# Patient Record
Sex: FEMALE | Race: WHITE | NOT HISPANIC OR LATINO
[De-identification: names, ages, dates, MRNs, and addresses within clinical notes are randomized per-mention and may not be internally consistent; named-entity substitution may affect disease eponyms.]

---

## 2017-06-12 ENCOUNTER — APPOINTMENT (OUTPATIENT)
Dept: CARDIOLOGY | Facility: CLINIC | Age: 77
End: 2017-06-12

## 2017-07-18 ENCOUNTER — TRANSCRIPTION ENCOUNTER (OUTPATIENT)
Age: 77
End: 2017-07-18

## 2017-09-29 ENCOUNTER — RECORD ABSTRACTING (OUTPATIENT)
Age: 77
End: 2017-09-29

## 2017-09-29 DIAGNOSIS — K21.9 GASTRO-ESOPHAGEAL REFLUX DISEASE W/OUT ESOPHAGITIS: ICD-10-CM

## 2017-09-29 DIAGNOSIS — Z86.39 PERSONAL HISTORY OF OTHER ENDOCRINE, NUTRITIONAL AND METABOLIC DISEASE: ICD-10-CM

## 2017-09-29 DIAGNOSIS — E03.9 HYPOTHYROIDISM, UNSPECIFIED: ICD-10-CM

## 2017-09-29 DIAGNOSIS — Z72.0 TOBACCO USE: ICD-10-CM

## 2017-09-29 DIAGNOSIS — G40.909 EPILEPSY, UNSPECIFIED, NOT INTRACTABLE, W/OUT STATUS EPILEPTICUS: ICD-10-CM

## 2017-09-29 DIAGNOSIS — G62.9 POLYNEUROPATHY, UNSPECIFIED: ICD-10-CM

## 2017-09-29 DIAGNOSIS — Z98.890 OTHER SPECIFIED POSTPROCEDURAL STATES: ICD-10-CM

## 2017-09-29 RX ORDER — LEVOTHYROXINE SODIUM 75 UG/1
75 TABLET ORAL DAILY
Refills: 0 | Status: ACTIVE | COMMUNITY

## 2018-01-29 ENCOUNTER — NON-APPOINTMENT (OUTPATIENT)
Age: 78
End: 2018-01-29

## 2018-01-29 ENCOUNTER — APPOINTMENT (OUTPATIENT)
Dept: CARDIOLOGY | Facility: CLINIC | Age: 78
End: 2018-01-29
Payer: MEDICARE

## 2018-01-29 VITALS
BODY MASS INDEX: 21.21 KG/M2 | HEIGHT: 66 IN | WEIGHT: 132 LBS | DIASTOLIC BLOOD PRESSURE: 66 MMHG | SYSTOLIC BLOOD PRESSURE: 118 MMHG | HEART RATE: 82 BPM

## 2018-01-29 PROCEDURE — 99214 OFFICE O/P EST MOD 30 MIN: CPT

## 2018-01-29 PROCEDURE — 93000 ELECTROCARDIOGRAM COMPLETE: CPT

## 2018-01-29 RX ORDER — PRIMIDONE 50 MG/1
50 TABLET ORAL TWICE DAILY
Refills: 0 | Status: DISCONTINUED | COMMUNITY
End: 2018-01-29

## 2018-01-29 RX ORDER — LEVETIRACETAM 500 MG/1
500 TABLET, FILM COATED ORAL TWICE DAILY
Refills: 0 | Status: DISCONTINUED | COMMUNITY
End: 2018-01-29

## 2018-01-29 RX ORDER — OXYBUTYNIN CHLORIDE 5 MG/1
5 TABLET ORAL
Refills: 0 | Status: DISCONTINUED | COMMUNITY
End: 2018-01-29

## 2018-01-29 RX ORDER — OMEPRAZOLE 40 MG/1
40 CAPSULE, DELAYED RELEASE ORAL
Qty: 30 | Refills: 4 | Status: DISCONTINUED | COMMUNITY
End: 2018-01-29

## 2018-08-01 ENCOUNTER — APPOINTMENT (OUTPATIENT)
Dept: CARDIOLOGY | Facility: CLINIC | Age: 78
End: 2018-08-01
Payer: MEDICARE

## 2018-08-01 PROCEDURE — 93306 TTE W/DOPPLER COMPLETE: CPT

## 2018-08-17 ENCOUNTER — RECORD ABSTRACTING (OUTPATIENT)
Age: 78
End: 2018-08-17

## 2018-08-20 ENCOUNTER — APPOINTMENT (OUTPATIENT)
Dept: CARDIOLOGY | Facility: CLINIC | Age: 78
End: 2018-08-20
Payer: MEDICARE

## 2018-08-20 VITALS
HEART RATE: 76 BPM | BODY MASS INDEX: 21.86 KG/M2 | OXYGEN SATURATION: 95 % | WEIGHT: 136 LBS | DIASTOLIC BLOOD PRESSURE: 60 MMHG | SYSTOLIC BLOOD PRESSURE: 120 MMHG | HEIGHT: 66 IN

## 2018-08-20 PROCEDURE — 99214 OFFICE O/P EST MOD 30 MIN: CPT

## 2018-08-20 RX ORDER — ASPIRIN ENTERIC COATED TABLETS 81 MG 81 MG/1
81 TABLET, DELAYED RELEASE ORAL
Refills: 0 | Status: DISCONTINUED | COMMUNITY
End: 2018-08-20

## 2019-02-08 ENCOUNTER — RECORD ABSTRACTING (OUTPATIENT)
Age: 79
End: 2019-02-08

## 2019-02-27 ENCOUNTER — RECORD ABSTRACTING (OUTPATIENT)
Age: 79
End: 2019-02-27

## 2019-03-04 ENCOUNTER — APPOINTMENT (OUTPATIENT)
Dept: CARDIOLOGY | Facility: CLINIC | Age: 79
End: 2019-03-04
Payer: MEDICARE

## 2019-03-04 ENCOUNTER — RECORD ABSTRACTING (OUTPATIENT)
Age: 79
End: 2019-03-04

## 2019-03-04 ENCOUNTER — NON-APPOINTMENT (OUTPATIENT)
Age: 79
End: 2019-03-04

## 2019-03-04 VITALS
OXYGEN SATURATION: 96 % | BODY MASS INDEX: 22.02 KG/M2 | HEIGHT: 66 IN | HEART RATE: 76 BPM | DIASTOLIC BLOOD PRESSURE: 64 MMHG | WEIGHT: 137 LBS | SYSTOLIC BLOOD PRESSURE: 118 MMHG

## 2019-03-04 PROCEDURE — 99214 OFFICE O/P EST MOD 30 MIN: CPT

## 2019-03-04 PROCEDURE — 93000 ELECTROCARDIOGRAM COMPLETE: CPT

## 2019-03-04 RX ORDER — CONJUGATED ESTROGENS 0.62 MG/G
0.62 CREAM VAGINAL DAILY
Refills: 0 | Status: DISCONTINUED | COMMUNITY
End: 2019-03-04

## 2019-03-04 NOTE — HISTORY OF PRESENT ILLNESS
[FreeTextEntry1] : Active medical problems as noted below.\par \par •Hypothyroidism/ thyroid nodule on Synthroid replacement.\par \par •Dyslipidemia, on pravastatin, tolerating it well.\par \par •Mild carotid atherosclerotic vascular disease. Asymptomatic.\par \par •Mild mitral and aortic insufficiency and tricuspid regurgitation. No signs of CHF.\par \par •She has also been diagnosed to have possible peripheral neuropathy. \par \par PAD mild, no claudication\par

## 2019-03-04 NOTE — ASSESSMENT
[FreeTextEntry1] : past tests for reference:\par \par GURU and PVR study which was done on May 7, 2014 showed mild atherosclerosis, nonobstructive, overall improvement in GURU i\par Carotid Doppler study on January 16, 2015 showed mild nonobstructive disease bilaterally. The thyroid nodule on the left side was noted again.\par Echocardiogram which was done on January 16, 2015 showed EF around 60%, normal chamber sizes. There was trace-to-mild mitral regurgitation, mild-to-moderate tricuspid regurgitation. Pulmonary artery systolic pressure was stable.\par Echocardiogram 4/28/2016 EF 60%.  Mild mitral regurgitation mild tricuspid regurgitation.  The ASP 33 mmHg\par Carotid Doppler study.  Mild atherosclerosisHolter monitor 3/14/2016.  Normal sinus rhythm.  Average heart rate 71.  Maximum heart rate 138.  Minimum heart rate 47.  Premature ventricular beats noted.  3 beats NSVT versus aberrancy noted\par nuclear perfusion: 6/20/16\par 4 min 7 METs. non ischemic symptoms, VPC's non ischemic ekg's, non ischemic perfusion scan\par cardiac cath 8/12: normal coronaries\par Labs from May 17, 2017 reviewed. TSH 2.4, atypia, . HDL 68. Triglycerides 76. CMP is stable with creatinine 0.55\par \par Reviewed on January 29, 2018.\par Labs from January 26, 2018. CBC CMP was reported normal. LDL 83, triglycerides 84, HDL 57, total cholesterol 157. TSH 1.4.\par \par EKG ordered and interpreted by me on January 29 of 18. Indication history of cardiac arrhythmias. Dizziness. Interpretation. Normal sinus rhythm. Nonspecific ST-T changes.\par \par Reviewed on January 29, 2018.\par Labs from January 26, 2018. CBC CMP was reported normal. LDL 83, triglycerides 84, HDL 57, total cholesterol 157. TSH 1.4.\par \par EKG ordered and interpreted by me on January 29 of 18. Indication history of cardiac arrhythmias. Dizziness. Interpretation. Normal sinus rhythm. Nonspecific ST-T changes.\par \par Reviewed on August 20, 2018.\par Labs from June 27, 2018. Normal CBC.\par Other labs, which were done with primary care physician. Are not available for me to review requested\par Echocardiogram August 1, 2018, ejection fraction 60% normal chamber dimensions, mild mitral and aortic insufficiency. Normal pulmonary pressures.\par \par Reviewed on March 4, 2019\par EKG. March 4, 2019. Indication be a cardiac arrhythmia. Dizziness, but interpretation. Normal sinus rhythm. Nonspecific ST-T changes. [place]

## 2019-03-04 NOTE — REASON FOR VISIT
[Follow-Up - Clinic] : a clinic follow-up of [Hyperlipidemia] : hyperlipidemia [Peripheral Vascular Disease] : peripheral vascular disease [FreeTextEntry1] : 79 years old comes in for followup consultation. She did not get her labs done.\par She has muscle ache, involving the upper extremities. Mild severity. No focal weakness. No aggravating or relieving factors. Continue tremors.\par Continued use of walker.\par Continued intermittent dizziness. Non-postural. Evaluation has been negative according to her. No syncopal episode. No association with palpitations. No headache or focal weakness. Unchanged from before.\par She has no chest pain. No PND, orthopnea.\par She has no pedal edema. No palpitation,  or syncopal episodes.\par No recent hospitalization.

## 2019-03-04 NOTE — PHYSICAL EXAM
[General Appearance - Well Developed] : well developed [Normal Appearance] : normal appearance [Well Groomed] : well groomed [General Appearance - Well Nourished] : well nourished [No Deformities] : no deformities [General Appearance - In No Acute Distress] : no acute distress [Normal Conjunctiva] : the conjunctiva exhibited no abnormalities [Eyelids - No Xanthelasma] : the eyelids demonstrated no xanthelasmas [Normal Jugular Venous A Waves Present] : normal jugular venous A waves present [Normal Jugular Venous V Waves Present] : normal jugular venous V waves present [No Jugular Venous Barnard A Waves] : no jugular venous barnard A waves [Respiration, Rhythm And Depth] : normal respiratory rhythm and effort [Exaggerated Use Of Accessory Muscles For Inspiration] : no accessory muscle use [Auscultation Breath Sounds / Voice Sounds] : lungs were clear to auscultation bilaterally [Heart Rate And Rhythm] : heart rate and rhythm were normal [Heart Sounds] : normal S1 and S2 [Arterial Pulses Normal] : the arterial pulses were normal [Edema] : no peripheral edema present [Veins - Varicosity Changes] : no varicosital changes were noted in the lower extremities [FreeTextEntry1] : walks with a walker [Nail Clubbing] : no clubbing of the fingernails [Cyanosis, Localized] : no localized cyanosis [Skin Color & Pigmentation] : normal skin color and pigmentation [] : no rash [Oriented To Time, Place, And Person] : oriented to person, place, and time [Affect] : the affect was normal [Mood] : the mood was normal [No Anxiety] : not feeling anxious

## 2019-03-04 NOTE — DISCUSSION/SUMMARY
[FreeTextEntry1] : 79-year-old , overall stable from a cardiovascular point of view, dove with limited functional status. Clinically, no signs of unstable, CAD, CHF.\par Medical problems includes\par #1 hyperlipidemia  on statin. Tolerating it well. In presence of myalgia. I have recommended her to have a fasting lipid panel, total CPK. Along with CMP. And TSH.\par #2 peripheral arterial disease with borderline GURU/PVR study and carotid atherosclerosis. \par Continue lifestyle and risk factor modifications. Continue statin therapy. No neurological event. No significant signs of ischemia on lower extremity examination  or significant claudication pain. Continue activity in the form of walking\par Aspirin to be restarted when safe from a gastroenterology point of view.\par She could not get ABIs/. PVR study done. She will reschedule.\par #3 history of nonsustained ventricular tachycardia. No correlation with her dizziness. Preserved LV systolic function. Normal coronary arteries. A stable blood pressure, heart rate. Not on beta blockers. No history of syncopal episode. Continue present medications at present. \par #4 mitral insufficiency. aortic insufficiency. non rheuatic. Stable on clinical examination,  Continue to follow.\par \par Counseling regarding low saturated fat, salt and carbohydrate intake was reviewed. Active lifestyle and regular. Exercise along with weight management is advised.\par \par All the above were at length reviewed. Answered all the questions. Thank you very much for this kind referral. Please do not hesitate to give me a call for any question.\par Part of this transcription was done with voice recognition software and phonetically similar errors are common. I apologize for that. Please donot hesitate to call for any questions due to above.\par \par f/u  6 months

## 2019-03-04 NOTE — REVIEW OF SYSTEMS
[Shortness Of Breath] : no shortness of breath [Dyspnea on exertion] : not dyspnea during exertion [Chest  Pressure] : no chest pressure [Chest Pain] : chest pain [Lower Ext Edema] : no extremity edema [Leg Claudication] : no intermittent leg claudication [Palpitations] : no palpitations [Abdominal Pain] : no abdominal pain [Heartburn] : heartburn [Joint Pain] : joint pain [Joint Stiffness] : joint stiffness [Dizziness] : dizziness [Tremor] : a tremor was seen [Numbness (Hypesthesia)] : no numbness [Convulsions] : no convulsions [Tingling (Paresthesia)] : no tingling [Negative] : Heme/Lymph

## 2019-06-02 ENCOUNTER — TRANSCRIPTION ENCOUNTER (OUTPATIENT)
Age: 79
End: 2019-06-02

## 2019-10-31 ENCOUNTER — APPOINTMENT (OUTPATIENT)
Dept: CARDIOLOGY | Facility: CLINIC | Age: 79
End: 2019-10-31

## 2019-11-02 ENCOUNTER — TRANSCRIPTION ENCOUNTER (OUTPATIENT)
Age: 79
End: 2019-11-02

## 2019-11-11 ENCOUNTER — APPOINTMENT (OUTPATIENT)
Dept: CARDIOLOGY | Facility: CLINIC | Age: 79
End: 2019-11-11
Payer: MEDICARE

## 2019-11-11 ENCOUNTER — NON-APPOINTMENT (OUTPATIENT)
Age: 79
End: 2019-11-11

## 2019-11-11 VITALS
SYSTOLIC BLOOD PRESSURE: 118 MMHG | HEART RATE: 84 BPM | BODY MASS INDEX: 21.69 KG/M2 | WEIGHT: 135 LBS | DIASTOLIC BLOOD PRESSURE: 64 MMHG | OXYGEN SATURATION: 96 % | HEIGHT: 66 IN

## 2019-11-11 PROCEDURE — 99214 OFFICE O/P EST MOD 30 MIN: CPT

## 2019-11-11 PROCEDURE — 93000 ELECTROCARDIOGRAM COMPLETE: CPT

## 2019-11-11 RX ORDER — OXYBUTYNIN CHLORIDE 5 MG/1
5 TABLET, EXTENDED RELEASE ORAL TWICE DAILY
Refills: 0 | Status: ACTIVE | COMMUNITY

## 2019-11-11 RX ORDER — ASPIRIN ENTERIC COATED TABLETS 81 MG 81 MG/1
81 TABLET, DELAYED RELEASE ORAL
Refills: 0 | Status: ACTIVE | COMMUNITY
Start: 2019-11-11

## 2019-11-11 NOTE — PHYSICAL EXAM
[General Appearance - Well Developed] : well developed [Normal Appearance] : normal appearance [Well Groomed] : well groomed [General Appearance - Well Nourished] : well nourished [No Deformities] : no deformities [General Appearance - In No Acute Distress] : no acute distress [Normal Conjunctiva] : the conjunctiva exhibited no abnormalities [Eyelids - No Xanthelasma] : the eyelids demonstrated no xanthelasmas [Normal Jugular Venous A Waves Present] : normal jugular venous A waves present [Normal Jugular Venous V Waves Present] : normal jugular venous V waves present [No Jugular Venous Barnard A Waves] : no jugular venous barnard A waves [Exaggerated Use Of Accessory Muscles For Inspiration] : no accessory muscle use [Auscultation Breath Sounds / Voice Sounds] : lungs were clear to auscultation bilaterally [Respiration, Rhythm And Depth] : normal respiratory rhythm and effort [Heart Rate And Rhythm] : heart rate and rhythm were normal [Heart Sounds] : normal S1 and S2 [Arterial Pulses Normal] : the arterial pulses were normal [Veins - Varicosity Changes] : no varicosital changes were noted in the lower extremities [Edema] : no peripheral edema present [FreeTextEntry1] : walks with a walker [Abdomen Soft] : soft [Nail Clubbing] : no clubbing of the fingernails [Cyanosis, Localized] : no localized cyanosis [Skin Color & Pigmentation] : normal skin color and pigmentation [Oriented To Time, Place, And Person] : oriented to person, place, and time [] : no rash [Affect] : the affect was normal [Mood] : the mood was normal [No Anxiety] : not feeling anxious

## 2019-11-11 NOTE — REVIEW OF SYSTEMS
[Shortness Of Breath] : shortness of breath [Dyspnea on exertion] : dyspnea during exertion [Chest Pain] : chest pain [Chest  Pressure] : no chest pressure [Leg Claudication] : no intermittent leg claudication [Lower Ext Edema] : no extremity edema [Palpitations] : no palpitations [Abdominal Pain] : no abdominal pain [Heartburn] : heartburn [Joint Pain] : joint pain [Dizziness] : dizziness [Joint Stiffness] : joint stiffness [Tremor] : a tremor was seen [Numbness (Hypesthesia)] : no numbness [Convulsions] : no convulsions [Tingling (Paresthesia)] : no tingling [Negative] : Heme/Lymph

## 2019-11-11 NOTE — REASON FOR VISIT
[Chest Pain] : chest pain [Follow-Up - Clinic] : a clinic follow-up of [Hyperlipidemia] : hyperlipidemia [Peripheral Vascular Disease] : peripheral vascular disease [Dyspnea] : dyspnea [FreeTextEntry1] : 79-year-old female is seen in followup consultation because of complaint of\par Intermittent dyspnea/shortness of breath. She has to take deep breaths. There is no associated chest pain, PND, orthopnea, or pedal edema. Nonexertional. He was seen in a walk-in clinic. No evidence of pulmonary disease. There is no increase in her weight. There is no source or palpitations. Symptoms of mild severity.\par Also complained of intermittent burning sensation in the chest region and jaw region. Usually resolves with use of Pepcid. Nonexertional. No other associated symptoms. Mild to moderate severity. Loss for many minutes. No radiation other than as noted above. There is no associated diaphoresis.\par No recent hospitalization.

## 2019-11-11 NOTE — DISCUSSION/SUMMARY
[FreeTextEntry1] : 79-year-old , overall stable from a cardiovascular point of view, dove with limited functional status. \par Medical problems includes\par #1 hyperlipidemia  on statin. Tolerating it well. In presence of myalgia. I have recommended her to have a fasting lipid panel, total CPK. Along with CMP. And TSH.\par #2 peripheral arterial disease with borderline GURU/PVR study and carotid atherosclerosis. \par Continue lifestyle and risk factor modifications. Continue statin therapy. No neurological event. No significant signs of ischemia on lower extremity examination  or significant claudication pain. Continue activity in the form of walking\par Aspirin to be restarted when safe from a gastroenterology point of view.\par #3 history of nonsustained ventricular tachycardia. No correlation with her dizziness. Preserved LV systolic function. Normal coronary arteries. A stable blood pressure, heart rate. Not on beta blockers. No history of syncopal episode. Continue present medications at present. \par #4 Dyspnea at rest. Clinically, no signs of congestive heart failure. EKG normal sinus rhythm with stable vital signs. History of mitral and aortic insufficiency. Followup echocardiogram recommended\par #5. Chest pain. Responding to PPI. Nonexertional. Normal EKG. Normal coronary arteries in 2016. Low probability of coronary insufficiency. More likely to be gastrointestinal. Continue omeprazole. If continued symptoms recommended to see a gastroenterologist. If there is persistent symptoms we does not respond to PPI/H2 blockers. She will call 911. \par \par Counseling regarding low saturated fat, salt and carbohydrate intake was reviewed. Active lifestyle and regular. Exercise along with weight management is advised.\par \par All the above were at length reviewed. Answered all the questions. Thank you very much for this kind referral. Please do not hesitate to give me a call for any question.\par Part of this transcription was done with voice recognition software and phonetically similar errors are common. I apologize for that. Please donot hesitate to call for any questions due to above.\par \par f/u  6 months

## 2019-11-11 NOTE — ASSESSMENT
[FreeTextEntry1] : past tests for reference:\par \par GURU and PVR study which was done on May 7, 2014 showed mild atherosclerosis, nonobstructive, overall improvement in GURU i\par Carotid Doppler study on January 16, 2015 showed mild nonobstructive disease bilaterally. The thyroid nodule on the left side was noted again.\par Echocardiogram which was done on January 16, 2015 showed EF around 60%, normal chamber sizes. There was trace-to-mild mitral regurgitation, mild-to-moderate tricuspid regurgitation. Pulmonary artery systolic pressure was stable.\par Echocardiogram 4/28/2016 EF 60%.  Mild mitral regurgitation mild tricuspid regurgitation.  The ASP 33 mmHg\par Carotid Doppler study.  Mild atherosclerosisHolter monitor 3/14/2016.  Normal sinus rhythm.  Average heart rate 71.  Maximum heart rate 138.  Minimum heart rate 47.  Premature ventricular beats noted.  3 beats NSVT versus aberrancy noted\par nuclear perfusion: 6/20/16\par 4 min 7 METs. non ischemic symptoms, VPC's non ischemic ekg's, non ischemic perfusion scan\par cardiac cath 8/12: normal coronaries\par Labs from May 17, 2017 reviewed. TSH 2.4, atypia, . HDL 68. Triglycerides 76. CMP is stable with creatinine 0.55\par \par Reviewed on January 29, 2018.\par Labs from January 26, 2018. CBC CMP was reported normal. LDL 83, triglycerides 84, HDL 57, total cholesterol 157. TSH 1.4.\par \par EKG ordered and interpreted by me on January 29 of 18. Indication history of cardiac arrhythmias. Dizziness. Interpretation. Normal sinus rhythm. Nonspecific ST-T changes.\par \par Reviewed on January 29, 2018.\par Labs from January 26, 2018. CBC CMP was reported normal. LDL 83, triglycerides 84, HDL 57, total cholesterol 157. TSH 1.4.\par \par EKG ordered and interpreted by me on January 29 of 18. Indication history of cardiac arrhythmias. Dizziness. Interpretation. Normal sinus rhythm. Nonspecific ST-T changes.\par \par Reviewed on August 20, 2018.\par Labs from June 27, 2018. Normal CBC.\par Other labs, which were done with primary care physician. Are not available for me to review requested\par Echocardiogram August 1, 2018, ejection fraction 60% normal chamber dimensions, mild mitral and aortic insufficiency. Normal pulmonary pressures.\par \par Reviewed on March 4, 2019\par EKG. March 4, 2019. Indication be a cardiac arrhythmia. Dizziness, but interpretation. Normal sinus rhythm. Nonspecific ST-T changes. \par \par Reviewed on November 11, 2019\par Labs September third 2019 were reviewed.\par EKG. November 11, 2019 indications chest pain. Shortness of breath. Interpretation. Normal sinus rhythm.

## 2019-11-14 ENCOUNTER — APPOINTMENT (OUTPATIENT)
Dept: CARDIOLOGY | Facility: CLINIC | Age: 79
End: 2019-11-14
Payer: MEDICARE

## 2019-11-14 PROCEDURE — 93306 TTE W/DOPPLER COMPLETE: CPT

## 2019-11-15 ENCOUNTER — APPOINTMENT (OUTPATIENT)
Dept: CARDIOLOGY | Facility: CLINIC | Age: 79
End: 2019-11-15

## 2020-03-09 ENCOUNTER — APPOINTMENT (OUTPATIENT)
Dept: CARDIOLOGY | Facility: CLINIC | Age: 80
End: 2020-03-09
Payer: MEDICARE

## 2020-03-09 VITALS
DIASTOLIC BLOOD PRESSURE: 70 MMHG | HEART RATE: 75 BPM | OXYGEN SATURATION: 95 % | WEIGHT: 146 LBS | SYSTOLIC BLOOD PRESSURE: 122 MMHG | BODY MASS INDEX: 23.46 KG/M2 | HEIGHT: 66 IN

## 2020-03-09 DIAGNOSIS — M79.10 MYALGIA, UNSPECIFIED SITE: ICD-10-CM

## 2020-03-09 PROCEDURE — 99214 OFFICE O/P EST MOD 30 MIN: CPT

## 2020-03-09 RX ORDER — DENOSUMAB 60 MG/ML
60 INJECTION SUBCUTANEOUS
Refills: 0 | Status: DISCONTINUED | COMMUNITY
End: 2020-03-09

## 2020-03-09 NOTE — DISCUSSION/SUMMARY
[FreeTextEntry1] : 79-year-old , overall stable from a cardiovascular point of view, dove with limited functional status. \par Medical problems includes\par #1 hyperlipidemia  on statin.  Her labs are stable.  Though with her complaint of weakness when getting up which suggests more proximal muscle weakness I have recommended her to stop her pravastatin for 1 month.  If there is no improvement she will go back on pravastatin.  If there is significant improvement will discuss further regarding changing the medication which may include use of PCSK9 inhibitors.\par #2 peripheral arterial disease with borderline GURU/PVR study and carotid atherosclerosis. \par Continue lifestyle and risk factor modifications. Continue statin therapy. No neurological event. No significant signs of ischemia on lower extremity examination  or significant claudication pain. Continue activity in the form of walking\par Continue with aspirin.\par #3 history of nonsustained ventricular tachycardia. No correlation with her dizziness. Preserved LV systolic function. Normal coronary arteries. A stable blood pressure, heart rate. Not on beta blockers. No history of syncopal episode. Continue present medications at present. \par #4 Dyspnea at rest. Clinically, no signs of congestive heart failure. EKG normal sinus rhythm with stable vital signs. History of mitral and aortic insufficiency.\par Echocardiogram was reviewed.  Stable findings discussed.  Continue to follow.\par #5. Chest pain. Responding to PPI. Nonexertional. Normal EKG. Normal coronary arteries in 2016. Low probability of coronary insufficiency. More likely to be gastrointestinal. Continue omeprazole. If continued symptoms recommended to see a gastroenterologist. If there is persistent symptoms we does not respond to PPI/H2 blockers. She will call 911. \par \par Counseling regarding low saturated fat, salt and carbohydrate intake was reviewed. Active lifestyle and regular. Exercise along with weight management is advised.\par \par All the above were at length reviewed. Answered all the questions. Thank you very much for this kind referral. Please do not hesitate to give me a call for any question.\par Part of this transcription was done with voice recognition software and phonetically similar errors are common. I apologize for that. Please donot hesitate to call for any questions due to above.\par \par f/u  6 months

## 2020-03-09 NOTE — ASSESSMENT
[FreeTextEntry1] : past tests for reference:\par \par GURU and PVR study which was done on May 7, 2014 showed mild atherosclerosis, nonobstructive, overall improvement in GURU i\par Carotid Doppler study on January 16, 2015 showed mild nonobstructive disease bilaterally. The thyroid nodule on the left side was noted again.\par Echocardiogram which was done on January 16, 2015 showed EF around 60%, normal chamber sizes. There was trace-to-mild mitral regurgitation, mild-to-moderate tricuspid regurgitation. Pulmonary artery systolic pressure was stable.\par Echocardiogram 4/28/2016 EF 60%.  Mild mitral regurgitation mild tricuspid regurgitation.  The ASP 33 mmHg\par Carotid Doppler study.  Mild atherosclerosisHolter monitor 3/14/2016.  Normal sinus rhythm.  Average heart rate 71.  Maximum heart rate 138.  Minimum heart rate 47.  Premature ventricular beats noted.  3 beats NSVT versus aberrancy noted\par nuclear perfusion: 6/20/16\par 4 min 7 METs. non ischemic symptoms, VPC's non ischemic ekg's, non ischemic perfusion scan\par cardiac cath 8/12: normal coronaries\par Labs from May 17, 2017 reviewed. TSH 2.4, atypia, . HDL 68. Triglycerides 76. CMP is stable with creatinine 0.55\par \par Reviewed on January 29, 2018.\par Labs from January 26, 2018. CBC CMP was reported normal. LDL 83, triglycerides 84, HDL 57, total cholesterol 157. TSH 1.4.\par \par EKG ordered and interpreted by me on January 29 of 18. Indication history of cardiac arrhythmias. Dizziness. Interpretation. Normal sinus rhythm. Nonspecific ST-T changes.\par \par Reviewed on January 29, 2018.\par Labs from January 26, 2018. CBC CMP was reported normal. LDL 83, triglycerides 84, HDL 57, total cholesterol 157. TSH 1.4.\par \par EKG ordered and interpreted by me on January 29 of 18. Indication history of cardiac arrhythmias. Dizziness. Interpretation. Normal sinus rhythm. Nonspecific ST-T changes.\par \par Reviewed on August 20, 2018.\par Labs from June 27, 2018. Normal CBC.\par Other labs, which were done with primary care physician. Are not available for me to review requested\par Echocardiogram August 1, 2018, ejection fraction 60% normal chamber dimensions, mild mitral and aortic insufficiency. Normal pulmonary pressures.\par \par Reviewed on March 4, 2019\par EKG. March 4, 2019. Indication be a cardiac arrhythmia. Dizziness, but interpretation. Normal sinus rhythm. Nonspecific ST-T changes. \par \par Reviewed on November 11, 2019\par Labs September third 2019 were reviewed.\par EKG. November 11, 2019 indications chest pain. Shortness of breath. Interpretation. Normal sinus rhythm.\par \par reviewed 3/9/19 \par labs 2/26/2020\par stable cbc, cmp ldl 96,  tc 182, hdl 71, tg 77\par echo 11/14/19 60% mild mr, normal la, pasp

## 2020-03-09 NOTE — PHYSICAL EXAM
[General Appearance - Well Developed] : well developed [Normal Appearance] : normal appearance [Well Groomed] : well groomed [General Appearance - Well Nourished] : well nourished [No Deformities] : no deformities [General Appearance - In No Acute Distress] : no acute distress [Normal Conjunctiva] : the conjunctiva exhibited no abnormalities [Eyelids - No Xanthelasma] : the eyelids demonstrated no xanthelasmas [Normal Jugular Venous A Waves Present] : normal jugular venous A waves present [Normal Jugular Venous V Waves Present] : normal jugular venous V waves present [No Jugular Venous Barnard A Waves] : no jugular venous barnard A waves [Respiration, Rhythm And Depth] : normal respiratory rhythm and effort [Exaggerated Use Of Accessory Muscles For Inspiration] : no accessory muscle use [Auscultation Breath Sounds / Voice Sounds] : lungs were clear to auscultation bilaterally [Heart Rate And Rhythm] : heart rate and rhythm were normal [Heart Sounds] : normal S1 and S2 [Arterial Pulses Normal] : the arterial pulses were normal [Edema] : no peripheral edema present [Veins - Varicosity Changes] : no varicosital changes were noted in the lower extremities [Abdomen Soft] : soft [Nail Clubbing] : no clubbing of the fingernails [Cyanosis, Localized] : no localized cyanosis [Skin Color & Pigmentation] : normal skin color and pigmentation [] : no rash [Oriented To Time, Place, And Person] : oriented to person, place, and time [Affect] : the affect was normal [Mood] : the mood was normal [No Anxiety] : not feeling anxious [FreeTextEntry1] : walks with a walker

## 2020-03-09 NOTE — REVIEW OF SYSTEMS
[Shortness Of Breath] : shortness of breath [Dyspnea on exertion] : dyspnea during exertion [Chest Pain] : chest pain [Heartburn] : heartburn [Joint Pain] : joint pain [Joint Stiffness] : joint stiffness [Dizziness] : dizziness [Tremor] : a tremor was seen [Negative] : Heme/Lymph [Chest  Pressure] : no chest pressure [Lower Ext Edema] : no extremity edema [Leg Claudication] : no intermittent leg claudication [Palpitations] : no palpitations [Abdominal Pain] : no abdominal pain [Numbness (Hypesthesia)] : no numbness [Convulsions] : no convulsions [Tingling (Paresthesia)] : no tingling

## 2020-03-09 NOTE — REASON FOR VISIT
[Follow-Up - Clinic] : a clinic follow-up of [Chest Pain] : chest pain [Dyspnea] : dyspnea [Hyperlipidemia] : hyperlipidemia [Peripheral Vascular Disease] : peripheral vascular disease [FreeTextEntry1] : 80-year-old female is seen in followup consultation because of complaint of\par myalgia weakness when she is trying to get up from sitting position, no weakness. moderate severity. progressive. \par Intermittent dyspnea/shortness of breath. She has to take deep breaths. There is no associated chest pain, PND, orthopnea, or pedal edema. Nonexertional. He was seen in a walk-in clinic. No evidence of pulmonary disease. There is no increase in her weight. There is no source or palpitations. Symptoms of mild severity.\par Also complained of intermittent burning sensation in the chest region and jaw region. Usually resolves with use of Pepcid. Nonexertional. No other associated symptoms. Mild to moderate severity. Loss for many minutes. No radiation other than as noted above. There is no associated diaphoresis.\par No recent hospitalization.

## 2020-10-02 ENCOUNTER — APPOINTMENT (OUTPATIENT)
Dept: CARDIOLOGY | Facility: CLINIC | Age: 80
End: 2020-10-02

## 2020-11-06 ENCOUNTER — APPOINTMENT (OUTPATIENT)
Dept: CARDIOLOGY | Facility: CLINIC | Age: 80
End: 2020-11-06
Payer: MEDICARE

## 2020-11-06 ENCOUNTER — NON-APPOINTMENT (OUTPATIENT)
Age: 80
End: 2020-11-06

## 2020-11-06 VITALS
WEIGHT: 143 LBS | HEART RATE: 78 BPM | OXYGEN SATURATION: 97 % | SYSTOLIC BLOOD PRESSURE: 148 MMHG | DIASTOLIC BLOOD PRESSURE: 64 MMHG | BODY MASS INDEX: 22.98 KG/M2 | HEIGHT: 66 IN

## 2020-11-06 DIAGNOSIS — Z78.9 OTHER SPECIFIED HEALTH STATUS: ICD-10-CM

## 2020-11-06 PROCEDURE — 93000 ELECTROCARDIOGRAM COMPLETE: CPT

## 2020-11-06 PROCEDURE — 99214 OFFICE O/P EST MOD 30 MIN: CPT

## 2020-11-06 RX ORDER — PRAVASTATIN SODIUM 40 MG/1
40 TABLET ORAL
Refills: 0 | Status: DISCONTINUED | COMMUNITY
End: 2020-11-06

## 2020-11-06 RX ORDER — MULTIVIT-MIN/FOLIC/VIT K/LYCOP 400-300MCG
25 MCG TABLET ORAL
Refills: 0 | Status: ACTIVE | COMMUNITY

## 2020-11-06 RX ORDER — VITAMIN E ACID SUCCINATE 268 MG
TABLET ORAL
Refills: 0 | Status: ACTIVE | COMMUNITY

## 2020-11-06 RX ORDER — B-COMPLEX WITH VITAMIN C
TABLET ORAL
Refills: 0 | Status: ACTIVE | COMMUNITY

## 2020-11-06 NOTE — REASON FOR VISIT
[Follow-Up - Clinic] : a clinic follow-up of [Chest Pain] : chest pain [Dyspnea] : dyspnea [Hyperlipidemia] : hyperlipidemia [Peripheral Vascular Disease] : peripheral vascular disease [FreeTextEntry1] : 80-year-old female is seen in the office for follow-up consultation with complaint of\par Myalgia shoulder pain stop taking pravastatin.  No significant relationship with that.  She feels that it could have been a difference.  But she does have structural problem with her shoulder and rotator cuff.\par Intermittent dyspnea/shortness of breath. She has to take deep breaths. There is no associated chest pain, PND, orthopnea, or pedal edema. Nonexertional. He was seen in a walk-in clinic. No evidence of pulmonary disease. There is no increase in her weight. There is no source or palpitations. Symptoms of mild severity.\par Also complained of intermittent burning sensation in the chest region and jaw region. Usually resolves with use of Pepcid. Nonexertional. No other associated symptoms. Mild to moderate severity. Loss for many minutes. No radiation other than as noted above. There is no associated diaphoresis.\par No recent hospitalization.

## 2020-11-06 NOTE — ASSESSMENT
[FreeTextEntry1] : past tests for reference:\par \par GURU and PVR study which was done on May 7, 2014 showed mild atherosclerosis, nonobstructive, overall improvement in GURU i\par Carotid Doppler study on January 16, 2015 showed mild nonobstructive disease bilaterally. The thyroid nodule on the left side was noted again.\par Echocardiogram which was done on January 16, 2015 showed EF around 60%, normal chamber sizes. There was trace-to-mild mitral regurgitation, mild-to-moderate tricuspid regurgitation. Pulmonary artery systolic pressure was stable.\par Echocardiogram 4/28/2016 EF 60%.  Mild mitral regurgitation mild tricuspid regurgitation.  The ASP 33 mmHg\par Carotid Doppler study.  Mild atherosclerosisHolter monitor 3/14/2016.  Normal sinus rhythm.  Average heart rate 71.  Maximum heart rate 138.  Minimum heart rate 47.  Premature ventricular beats noted.  3 beats NSVT versus aberrancy noted\par nuclear perfusion: 6/20/16\par 4 min 7 METs. non ischemic symptoms, VPC's non ischemic ekg's, non ischemic perfusion scan\par cardiac cath 8/12: normal coronaries\par Labs from May 17, 2017 reviewed. TSH 2.4, atypia, . HDL 68. Triglycerides 76. CMP is stable with creatinine 0.55\par \par Reviewed on January 29, 2018.\par Labs from January 26, 2018. CBC CMP was reported normal. LDL 83, triglycerides 84, HDL 57, total cholesterol 157. TSH 1.4.\par \par EKG ordered and interpreted by me on January 29 of 18. Indication history of cardiac arrhythmias. Dizziness. Interpretation. Normal sinus rhythm. Nonspecific ST-T changes.\par \par Reviewed on January 29, 2018.\par Labs from January 26, 2018. CBC CMP was reported normal. LDL 83, triglycerides 84, HDL 57, total cholesterol 157. TSH 1.4.\par \par EKG ordered and interpreted by me on January 29 of 18. Indication history of cardiac arrhythmias. Dizziness. Interpretation. Normal sinus rhythm. Nonspecific ST-T changes.\par \par Reviewed on August 20, 2018.\par Labs from June 27, 2018. Normal CBC.\par Other labs, which were done with primary care physician. Are not available for me to review requested\par Echocardiogram August 1, 2018, ejection fraction 60% normal chamber dimensions, mild mitral and aortic insufficiency. Normal pulmonary pressures.\par \par Reviewed on March 4, 2019\par EKG. March 4, 2019. Indication be a cardiac arrhythmia. Dizziness, but interpretation. Normal sinus rhythm. Nonspecific ST-T changes. \par \par Reviewed on November 11, 2019\par Labs September third 2019 were reviewed.\par EKG. November 11, 2019 indications chest pain. Shortness of breath. Interpretation. Normal sinus rhythm.\par \par reviewed 3/9/19 \par labs 2/26/2020\par stable cbc, cmp ldl 96,  tc 182, hdl 71, tg 77\par echo 11/14/19 60% mild mr, normal la, pasp\par \par Reviewed on November 6, 2020\par No recent labs\par EKG as noted above

## 2020-11-06 NOTE — DISCUSSION/SUMMARY
[FreeTextEntry1] : 80-year-old , overall stable from a cardiovascular point of view, dove with limited functional status. \par Medical problems includes\par #1 hyperlipidemia stop taking statin.  Recheck labs.  I feel her musculoskeletal symptoms are related to shoulder secondary to rotator cuff rather than statin therapy.  Nonetheless she does not want to take anything at present.  Will wait for the lipid panel to decide.\par #2 peripheral arterial disease with borderline GURU/PVR study and carotid atherosclerosis. \par Continue lifestyle and risk factor modifications. . No neurological event. No significant signs of ischemia on lower extremity examination  or significant claudication pain. Continue activity in the form of walking\par Continue with aspirin.  Based on lipid panel may need to consider statin therapy\par #3 history of nonsustained ventricular tachycardia. No correlation with her dizziness. Preserved LV systolic function. Normal coronary arteries. A stable blood pressure, heart rate. Not on beta blockers. No history of syncopal episode. Continue present medications at present. \par #4 Dyspnea at rest. Clinically, no signs of congestive heart failure. EKG normal sinus rhythm with stable vital signs. History of mitral and aortic insufficiency.\par Echocardiogram will be repeated.\par #5. Chest pain. Responding to PPI. Nonexertional. Normal EKG. Normal coronary arteries in 2016. Low probability of coronary insufficiency. More likely to be gastrointestinal. Continue omeprazole. If continued symptoms recommended to see a gastroenterologist. If there is persistent symptoms we does not respond to PPI/H2 blockers. She will call [phone]. \par \par Counseling regarding low saturated fat, salt and carbohydrate intake was reviewed. Active lifestyle and regular. Exercise along with weight management is advised.\par \par All the above were at length reviewed. Answered all the questions. Thank you very much for this kind referral. Please do not hesitate to give me a call for any question.\par Part of this transcription was done with voice recognition software and phonetically similar errors are common. I apologize for that. Please donot hesitate to call for any questions due to above.\par \par f/u  6 months

## 2020-11-18 ENCOUNTER — NON-APPOINTMENT (OUTPATIENT)
Age: 80
End: 2020-11-18

## 2020-12-29 ENCOUNTER — TRANSCRIPTION ENCOUNTER (OUTPATIENT)
Age: 80
End: 2020-12-29

## 2020-12-29 RX ORDER — PRAVASTATIN SODIUM 20 MG/1
20 TABLET ORAL
Qty: 90 | Refills: 1 | Status: DISCONTINUED | COMMUNITY
Start: 2020-11-20 | End: 2020-12-29

## 2021-01-25 ENCOUNTER — APPOINTMENT (OUTPATIENT)
Dept: CARDIOLOGY | Facility: CLINIC | Age: 81
End: 2021-01-25
Payer: MEDICARE

## 2021-01-25 ENCOUNTER — NON-APPOINTMENT (OUTPATIENT)
Age: 81
End: 2021-01-25

## 2021-01-25 VITALS
WEIGHT: 140 LBS | OXYGEN SATURATION: 97 % | TEMPERATURE: 97.1 F | BODY MASS INDEX: 22.5 KG/M2 | SYSTOLIC BLOOD PRESSURE: 142 MMHG | HEIGHT: 66 IN | HEART RATE: 87 BPM | DIASTOLIC BLOOD PRESSURE: 66 MMHG

## 2021-01-25 PROCEDURE — 93000 ELECTROCARDIOGRAM COMPLETE: CPT

## 2021-01-25 PROCEDURE — 99214 OFFICE O/P EST MOD 30 MIN: CPT | Mod: 25

## 2021-01-25 NOTE — HISTORY OF PRESENT ILLNESS
[FreeTextEntry1] : 81 year old female with history of GERD, HLD, mild valvular disease, and mild PAD (no claudicaiton) presents for cardiac clearance prior to D&C on 2/5/2021 due to abnormal vaginal bleeding. Patient has no chest pain, SOB, or palpitations. There is no history of MI, CVA, CHF, or previous coronary intervention.

## 2021-01-25 NOTE — PHYSICAL EXAM
[General Appearance - Well Developed] : well developed [Normal Appearance] : normal appearance [Well Groomed] : well groomed [General Appearance - Well Nourished] : well nourished [General Appearance - In No Acute Distress] : no acute distress [No Deformities] : no deformities [Normal Conjunctiva] : the conjunctiva exhibited no abnormalities [Eyelids - No Xanthelasma] : the eyelids demonstrated no xanthelasmas [Normal Oral Mucosa] : normal oral mucosa [No Oral Pallor] : no oral pallor [No Oral Cyanosis] : no oral cyanosis [] : no respiratory distress [Exaggerated Use Of Accessory Muscles For Inspiration] : no accessory muscle use [Respiration, Rhythm And Depth] : normal respiratory rhythm and effort [Auscultation Breath Sounds / Voice Sounds] : lungs were clear to auscultation bilaterally [Heart Rate And Rhythm] : heart rate and rhythm were normal [Murmurs] : no murmurs present [Heart Sounds] : normal S1 and S2 [Edema] : no peripheral edema present [FreeTextEntry1] : ambulates with walker

## 2021-01-25 NOTE — DISCUSSION/SUMMARY
[FreeTextEntry1] : 1. HLD: appears well controlled on atorvastatin 10mg daily. Reviewed labs from 1/13/2021 with patient. LDL at 62. \par \par 2. Mild valvular disease: with mild AI and MR. Periodic echo surveillance. \par \par 3. PAD: mild and with no claudication. Continue Aspirin 81mg daily (OK to hold 5 days prior to surgical procedure) and atorvastatin 10mg daily.\par \par Patient is optimized from a cardiology standpoint to undergo planned D&C and if necessary hysterectomy. \par \par Follow up in 6 months.

## 2021-01-26 ENCOUNTER — APPOINTMENT (OUTPATIENT)
Dept: CARDIOLOGY | Facility: CLINIC | Age: 81
End: 2021-01-26

## 2021-04-05 ENCOUNTER — APPOINTMENT (OUTPATIENT)
Dept: CARDIOLOGY | Facility: CLINIC | Age: 81
End: 2021-04-05

## 2021-04-20 ENCOUNTER — APPOINTMENT (OUTPATIENT)
Dept: CARDIOLOGY | Facility: CLINIC | Age: 81
End: 2021-04-20
Payer: MEDICARE

## 2021-04-20 VITALS
BODY MASS INDEX: 22.82 KG/M2 | HEART RATE: 75 BPM | SYSTOLIC BLOOD PRESSURE: 126 MMHG | OXYGEN SATURATION: 96 % | TEMPERATURE: 96.4 F | DIASTOLIC BLOOD PRESSURE: 62 MMHG | HEIGHT: 66 IN | WEIGHT: 142 LBS

## 2021-04-20 DIAGNOSIS — Z87.898 PERSONAL HISTORY OF OTHER SPECIFIED CONDITIONS: ICD-10-CM

## 2021-04-20 DIAGNOSIS — R06.00 DYSPNEA, UNSPECIFIED: ICD-10-CM

## 2021-04-20 PROCEDURE — 93000 ELECTROCARDIOGRAM COMPLETE: CPT

## 2021-04-20 PROCEDURE — 99214 OFFICE O/P EST MOD 30 MIN: CPT

## 2021-04-20 RX ORDER — OMEGA-3/DHA/EPA/FISH OIL 1200 MG
1200 CAPSULE ORAL
Refills: 0 | Status: DISCONTINUED | COMMUNITY
End: 2021-04-20

## 2021-04-20 RX ORDER — NITROGLYCERIN 0.4 MG/1
0.4 TABLET SUBLINGUAL
Refills: 0 | Status: DISCONTINUED | COMMUNITY
End: 2021-04-20

## 2021-04-20 NOTE — ADDENDUM
[FreeTextEntry1] : Please note the patient was reviewed with NP Marcelle Carrero.\par I was physically present during the service of the patient.\par I was directly involved in the management plan and recommendations of the care provided to the patient. \par I personally reviewed the history and physical examination as documented by the NP above.\par Apr 20 2021 11:00AM\par

## 2021-04-20 NOTE — HISTORY OF PRESENT ILLNESS
[Preoperative Visit] : for a medical evaluation prior to surgery [Surgeon Name ___] : surgeon: [unfilled] [Scheduled Procedure ___] : a [unfilled] [de-identified] : at [facility] [FreeTextEntry1] : \par 81 year old female with history of GERD, HLD, mild valvular disease, and mild PAD (no claudicaiton). Prev had normal cors by cath in 2016. She had D&C. She now requires hysterectomy suspected uterine ca. \par \par There is no history of MI, CVA, CHF, or previous coronary intervention.\par \par She walks with RW but generally remains active. Denies exertional chest pain or discomfort. Denies unusual shortness of breath, orthopnea, weight gain, or LE edema. Denies palpitations, lightheadedness, dizziness, or syncope.  \par \par EKG today 4/20/21 from PST normal sinus rhythm, unremarkable. \par \par Echo 11/2019 normal LV systolic function, EF 60%, mild to mod TR, Mild MR. PASP 31mmHg\par \par Carotid doppler 2016 mild nonobstructive atherosclerosis.\par \par She did have myalgias on pravastatin. Switched to atorva 10mg daily and LFTs/CK wnl on recheck, LDL 62 Jan 2021. No further myalgias.

## 2021-04-20 NOTE — REVIEW OF SYSTEMS
[see HPI] : see HPI [Joint Pain] : joint pain [Joint Stiffness] : joint stiffness [Negative] : Cardiovascular

## 2021-04-20 NOTE — PHYSICAL EXAM
[General Appearance - Well Developed] : well developed [Normal Appearance] : normal appearance [Well Groomed] : well groomed [General Appearance - Well Nourished] : well nourished [No Deformities] : no deformities [General Appearance - In No Acute Distress] : no acute distress [Normal Conjunctiva] : the conjunctiva exhibited no abnormalities [Eyelids - No Xanthelasma] : the eyelids demonstrated no xanthelasmas [No Oral Pallor] : no oral pallor [No Oral Cyanosis] : no oral cyanosis [Respiration, Rhythm And Depth] : normal respiratory rhythm and effort [Exaggerated Use Of Accessory Muscles For Inspiration] : no accessory muscle use [Auscultation Breath Sounds / Voice Sounds] : lungs were clear to auscultation bilaterally [Heart Rate And Rhythm] : heart rate and rhythm were normal [Heart Sounds] : normal S1 and S2 [Murmurs] : no murmurs present [Edema] : no peripheral edema present [] : no ischemic changes [Skin Color & Pigmentation] : normal skin color and pigmentation [Oriented To Time, Place, And Person] : oriented to person, place, and time [Affect] : the affect was normal [No Anxiety] : not feeling anxious [Mood] : the mood was normal [FreeTextEntry1] : ambulates with walker

## 2021-04-20 NOTE — DISCUSSION/SUMMARY
[FreeTextEntry1] : MARVIN ROJAS is a 81 year old F who presents today Apr 20, 2021 for preop cardiac clearance for Hysterectomy for malignancy, with Dr. Cisneros at [facility]. \par \par At present, no recent unstable coronary syndromes, decompensated heart failure, severe valvular heart disease or significant dysrhythmias.  \par Baseline functional status is limited.    \par The clinical benefit of the proposed procedure outweighs the associated cardiovascular risk.  \par Risk not attenuated with further CV testing.  \par Prior testing as outlined above.\par Optimized from a cardiovascular perspective.\par Minimize time off ASA, may hold 5-7 days prior and restart as soon as hemodynamically stable. \par DVT ppx per protocol\par \par HLD: appears well controlled on atorvastatin 10mg daily. Reviewed labs from 1/13/2021 with patient. LDL at 62. No further myalgias. \par \par Mild valvular disease: with mild AI and MR. At present no CHF.  Periodic echo surveillance. \par \par PAD: mild and with no claudication or neurologic symptom. Continue Aspirin 81mg daily (OK to hold 5-7 days prior to surgical procedure) and atorvastatin 10mg daily.\par \par Please do not hesitate to call for any questions or concerns. \par \par Sincerely,\par \par DILCIA Cisneros\par Supervising MD: Dr. Faustino Fernandez

## 2021-09-01 ENCOUNTER — APPOINTMENT (OUTPATIENT)
Dept: CARDIOLOGY | Facility: CLINIC | Age: 81
End: 2021-09-01
Payer: MEDICARE

## 2021-09-01 ENCOUNTER — NON-APPOINTMENT (OUTPATIENT)
Age: 81
End: 2021-09-01

## 2021-09-01 VITALS
DIASTOLIC BLOOD PRESSURE: 60 MMHG | BODY MASS INDEX: 24.16 KG/M2 | SYSTOLIC BLOOD PRESSURE: 122 MMHG | HEIGHT: 65 IN | HEART RATE: 107 BPM | OXYGEN SATURATION: 97 % | TEMPERATURE: 97.6 F | WEIGHT: 145 LBS

## 2021-09-01 DIAGNOSIS — C56.9 MALIGNANT NEOPLASM OF UNSPECIFIED OVARY: ICD-10-CM

## 2021-09-01 PROCEDURE — 99214 OFFICE O/P EST MOD 30 MIN: CPT | Mod: 25

## 2021-09-01 PROCEDURE — 93000 ELECTROCARDIOGRAM COMPLETE: CPT

## 2021-09-01 NOTE — REVIEW OF SYSTEMS
[Feeling Fatigued] : feeling fatigued [Negative] : Neurological [Fever] : no fever [Chills] : no chills [FreeTextEntry5] : see HPI [FreeTextEntry8] : see HPI

## 2021-09-01 NOTE — PHYSICAL EXAM
[No Murmur] : no murmur [Normal] : moves all extremities, no focal deficits, normal speech [de-identified] : No JVD, no carotid artery bruits auscultated bilaterally [de-identified] : regular, tachycardia [de-identified] : ambulates slowly with rolling walker

## 2021-09-01 NOTE — HISTORY OF PRESENT ILLNESS
[FreeTextEntry1] : Historical Perspective:\par 81 year old female with history of GERD, HLD, mild valvular disease, and mild PAD (no claudicaiton) presents for cardiac clearance prior to D&C on 2/5/2021 due to abnormal vaginal bleeding. Patient has no chest pain, SOB, or palpitations. There is no history of MI, CVA, CHF, or previous coronary intervention. \par \par Current Health Status:\par Since seeing me last, when I cleared her for D&C, patient was diagnosed with ovarian cancer. She underwent hysterectomy and started chemotherapy, total of 6 cycles. She is in good spirits. Has no chest pain, SOB, or palpitations.

## 2021-09-01 NOTE — CARDIOLOGY SUMMARY
[de-identified] : 09/1/2021, Sinus tachycardia and short SC interval, non-specific ST changes. [de-identified] : 11/14/2019, Mild MR, mild AI, mild-moderate TR with estimated PASP of 31mmHg. LVEF 60%.  [de-identified] : 8/3/2016, normal \par

## 2021-09-01 NOTE — DISCUSSION/SUMMARY
[FreeTextEntry1] : 1. HLD: appears well controlled on atorvastatin 10mg daily. Reviewed labs from 1/13/2021 with patient. LDL at 62. \par \par 2. Mild valvular disease: with mild AI and MR. Periodic echo surveillance. \par \par 3. PAD: mild and with no claudication. Continue Aspirin 81mg daily and atorvastatin 10mg daily.

## 2021-09-05 ENCOUNTER — TRANSCRIPTION ENCOUNTER (OUTPATIENT)
Age: 81
End: 2021-09-05

## 2021-09-24 ENCOUNTER — APPOINTMENT (OUTPATIENT)
Dept: CARDIOLOGY | Facility: CLINIC | Age: 81
End: 2021-09-24

## 2022-03-01 ENCOUNTER — APPOINTMENT (OUTPATIENT)
Dept: CARDIOLOGY | Facility: CLINIC | Age: 82
End: 2022-03-01

## 2022-04-15 ENCOUNTER — APPOINTMENT (OUTPATIENT)
Dept: CARDIOLOGY | Facility: CLINIC | Age: 82
End: 2022-04-15
Payer: MEDICARE

## 2022-04-15 VITALS
SYSTOLIC BLOOD PRESSURE: 128 MMHG | OXYGEN SATURATION: 96 % | DIASTOLIC BLOOD PRESSURE: 54 MMHG | BODY MASS INDEX: 23.32 KG/M2 | HEART RATE: 85 BPM | HEIGHT: 65 IN | TEMPERATURE: 97.1 F | WEIGHT: 140 LBS

## 2022-04-15 DIAGNOSIS — Z01.810 ENCOUNTER FOR PREPROCEDURAL CARDIOVASCULAR EXAMINATION: ICD-10-CM

## 2022-04-15 DIAGNOSIS — Z92.3 PERSONAL HISTORY OF IRRADIATION: ICD-10-CM

## 2022-04-15 DIAGNOSIS — Z92.21 PERSONAL HISTORY OF ANTINEOPLASTIC CHEMOTHERAPY: ICD-10-CM

## 2022-04-15 DIAGNOSIS — I73.9 PERIPHERAL VASCULAR DISEASE, UNSPECIFIED: ICD-10-CM

## 2022-04-15 PROCEDURE — 99214 OFFICE O/P EST MOD 30 MIN: CPT

## 2022-04-15 RX ORDER — OMEPRAZOLE 20 MG/1
20 TABLET, DELAYED RELEASE ORAL
Qty: 180 | Refills: 3 | Status: DISCONTINUED | COMMUNITY
Start: 1900-01-01 | End: 2022-04-15

## 2022-04-15 NOTE — REASON FOR VISIT
[Symptom and Test Evaluation] : symptom and test evaluation [Hyperlipidemia] : hyperlipidemia [Carotid, Aortic and Peripheral Vascular Disease] : carotid, aortic and peripheral vascular disease [FreeTextEntry3] : Dr. Carmona [FreeTextEntry1] : 82-year-old female is seen in the office for follow-up consultation.  Since last seen she has done very well with her cancer.  Underwent D&C/hysterectomy followed by chemotherapy and radiation.  She is in remission.\par She denies any significant chest pain.  She has stable exertional dyspnea without any PND orthopnea palpitation, dizziness, near syncopal or syncopal event\par She walks with a walker.\par She denies any claudication pain\par She has stable weight\par She has no significant side effects from taking aspirin/atorvastatin.\par I have reviewed her labs available to me.

## 2022-04-15 NOTE — DISCUSSION/SUMMARY
[FreeTextEntry1] : 82-year-old female is seen in the office with above medical history and active medical problems as noted below\par #1 hyperlipidemia.  Stable LDL cholesterol.  At goal of less than 70.  Tolerating low-dose of atorvastatin well.  Lifestyle and risk factor modifications reviewed.  Follow liver function tests as long as stable on a yearly basis on statin therapy.\par #2 peripheral arterial disease with borderline GURU/PVR study and carotid atherosclerosis. \par Continue lifestyle and risk factor modifications. . No neurological event. No significant signs of ischemia on lower extremity examination  or significant claudication pain. Continue activity in the form of walking\par Continue with aspirin.  Low risk of bleeding.  Continue statin therapy.\par #3 history of nonsustained ventricular tachycardia. No correlation with her dizziness. Preserved LV systolic function. Normal coronary arteries. A stable blood pressure, heart rate. Not on beta blockers. No history of syncopal episode. Continue present medications at present. \par #4  Nonrheumatic mitral and aortic insufficiency.  Follow-up echocardiogram to evaluate for severity of mitral and aortic insufficiency LV RV function pulmonary artery systolic pressure\par #5. Chest pain. Responding to PPI/H2 blockers.  Noncardiac.. Nonexertional. Normal EKG. Normal coronary arteries in 2016. Low probability of coronary insufficiency.  Continue management as being done.\par \par Counseling regarding low saturated fat, salt and carbohydrate intake was reviewed. Active lifestyle and regular. Exercise along with weight management is advised.\par \par All the above were at length reviewed. Answered all the questions. Thank you very much for this kind referral. Please do not hesitate to give me a call for any question.\par Part of this transcription was done with voice recognition software and phonetically similar errors are common. I apologize for that. Please donot hesitate to call for any questions due to above.\par \par Sincerely,\par Ekaterina Mccartney MD,FACC,RUI\par

## 2022-04-15 NOTE — CARDIOLOGY SUMMARY
[___] : [unfilled] [Normal] : normal [de-identified] : 09/1/2021, Sinus tachycardia and short NE interval, non-specific ST changes. [de-identified] : 11/14/2019, Mild MR, mild AI, mild-moderate TR with estimated PASP of 31mmHg. LVEF 60%.  [de-identified] : 8/3/2016, normal \par

## 2022-04-15 NOTE — ASSESSMENT
[FreeTextEntry1] : past tests for reference:\par \par GURU and PVR study which was done on May 7, 2014 showed mild atherosclerosis, nonobstructive, overall improvement in GURU i\par Carotid Doppler study on January 16, 2015 showed mild nonobstructive disease bilaterally. The thyroid nodule on the left side was noted again.\par Echocardiogram which was done on January 16, 2015 showed EF around 60%, normal chamber sizes. There was trace-to-mild mitral regurgitation, mild-to-moderate tricuspid regurgitation. Pulmonary artery systolic pressure was stable.\par Echocardiogram 4/28/2016 EF 60%.  Mild mitral regurgitation mild tricuspid regurgitation.  The ASP 33 mmHg\par Carotid Doppler study.  Mild atherosclerosisHolter monitor 3/14/2016.  Normal sinus rhythm.  Average heart rate 71.  Maximum heart rate 138.  Minimum heart rate 47.  Premature ventricular beats noted.  3 beats NSVT versus aberrancy noted\par nuclear perfusion: 6/20/16\par 4 min 7 METs. non ischemic symptoms, VPC's non ischemic ekg's, non ischemic perfusion scan\par cardiac cath 8/12: normal coronaries\par Labs from May 17, 2017 reviewed. TSH 2.4, atypia, . HDL 68. Triglycerides 76. CMP is stable with creatinine 0.55\par \par Reviewed on January 29, 2018.\par Labs from January 26, 2018. CBC CMP was reported normal. LDL 83, triglycerides 84, HDL 57, total cholesterol 157. TSH 1.4.\par \par EKG ordered and interpreted by me on January 29 of 18. Indication history of cardiac arrhythmias. Dizziness. Interpretation. Normal sinus rhythm. Nonspecific ST-T changes.\par \par Reviewed on January 29, 2018.\par Labs from January 26, 2018. CBC CMP was reported normal. LDL 83, triglycerides 84, HDL 57, total cholesterol 157. TSH 1.4.\par \par EKG ordered and interpreted by me on January 29 of 18. Indication history of cardiac arrhythmias. Dizziness. Interpretation. Normal sinus rhythm. Nonspecific ST-T changes.\par \par Reviewed on August 20, 2018.\par Labs from June 27, 2018. Normal CBC.\par Other labs, which were done with primary care physician. Are not available for me to review requested\par Echocardiogram August 1, 2018, ejection fraction 60% normal chamber dimensions, mild mitral and aortic insufficiency. Normal pulmonary pressures.\par \par Reviewed on March 4, 2019\par EKG. March 4, 2019. Indication be a cardiac arrhythmia. Dizziness, but interpretation. Normal sinus rhythm. Nonspecific ST-T changes. \par \par Reviewed on November 11, 2019\par Labs September third 2019 were reviewed.\par EKG. November 11, 2019 indications chest pain. Shortness of breath. Interpretation. Normal sinus rhythm.\par \par reviewed 3/9/19 \par labs 2/26/2020\par stable cbc, cmp ldl 96,  tc 182, hdl 71, tg 77\par echo 11/14/19 60% mild mr, normal la, pasp\par \par Reviewed on April 15, 2022.\par Labs from April 8, 2022.  Normal CBC sodium 138 potassium 4.4 creatinine 0.61 hemoglobin A1c 5.8 total cholesterol 158 triglycerides 67 HDL 71 LDL 66 TSH 2.57

## 2022-04-15 NOTE — REVIEW OF SYSTEMS
[Dyspnea on exertion] : dyspnea during exertion [Chest Discomfort] : no chest discomfort [Lower Ext Edema] : no extremity edema [Leg Claudication] : no intermittent leg claudication [Palpitations] : no palpitations [Orthopnea] : no orthopnea [PND] : no PND [Syncope] : no syncope [Joint Pain] : joint pain [Joint Stiffness] : joint stiffness [Dizziness] : no dizziness [Negative] : Psychiatric

## 2022-04-15 NOTE — PHYSICAL EXAM
[Well Developed] : well developed [Well Nourished] : well nourished [No Acute Distress] : no acute distress [Normal Venous Pressure] : normal venous pressure [No Carotid Bruit] : no carotid bruit [Normal S1, S2] : normal S1, S2 [No Rub] : no rub [No Gallop] : no gallop [Clear Lung Fields] : clear lung fields [Good Air Entry] : good air entry [No Respiratory Distress] : no respiratory distress  [No Edema] : no edema [No Cyanosis] : no cyanosis [No Clubbing] : no clubbing [Moves all extremities] : moves all extremities [Normal Speech] : normal speech [Alert and Oriented] : alert and oriented [de-identified] : Midsystolic murmur no gallop or rub. [de-identified] : Walks with a walker

## 2022-04-25 ENCOUNTER — APPOINTMENT (OUTPATIENT)
Dept: CARDIOLOGY | Facility: CLINIC | Age: 82
End: 2022-04-25
Payer: MEDICARE

## 2022-04-25 PROCEDURE — 93306 TTE W/DOPPLER COMPLETE: CPT

## 2022-05-02 ENCOUNTER — NON-APPOINTMENT (OUTPATIENT)
Age: 82
End: 2022-05-02

## 2022-09-06 ENCOUNTER — NON-APPOINTMENT (OUTPATIENT)
Age: 82
End: 2022-09-06

## 2022-10-14 ENCOUNTER — NON-APPOINTMENT (OUTPATIENT)
Age: 82
End: 2022-10-14

## 2022-12-16 ENCOUNTER — NON-APPOINTMENT (OUTPATIENT)
Age: 82
End: 2022-12-16

## 2023-02-06 ENCOUNTER — OFFICE (OUTPATIENT)
Dept: URBAN - METROPOLITAN AREA CLINIC 97 | Facility: CLINIC | Age: 83
Setting detail: OPHTHALMOLOGY
End: 2023-02-06
Payer: MEDICARE

## 2023-02-06 DIAGNOSIS — H26.493: ICD-10-CM

## 2023-02-06 DIAGNOSIS — H16.223: ICD-10-CM

## 2023-02-06 DIAGNOSIS — H35.373: ICD-10-CM

## 2023-02-06 DIAGNOSIS — H02.011: ICD-10-CM

## 2023-02-06 PROCEDURE — 67820 REVISE EYELASHES: CPT | Performed by: OPHTHALMOLOGY

## 2023-02-06 PROCEDURE — 92014 COMPRE OPH EXAM EST PT 1/>: CPT | Performed by: OPHTHALMOLOGY

## 2023-02-06 PROCEDURE — 92134 CPTRZ OPH DX IMG PST SGM RTA: CPT | Performed by: OPHTHALMOLOGY

## 2023-02-06 ASSESSMENT — REFRACTION_CURRENTRX
OD_CYLINDER: SPH
OS_OVR_VA: 20/
OS_SPHERE: +0.75
OD_VPRISM_DIRECTION: PROGS
OS_CYLINDER: SPH
OD_SPHERE: +0.50
OS_VPRISM_DIRECTION: PROGS
OD_OVR_VA: 20/
OS_ADD: +2.75
OD_ADD: +2.75

## 2023-02-06 ASSESSMENT — REFRACTION_MANIFEST
OD_VA2: 20/20(J1+)
OD_SPHERE: PLANO
OS_SPHERE: PLANO
OD_VA1: 20/20
OS_ADD: +3.00
OS_AXIS: 120
OD_ADD: +3.00
OD_SPHERE: PLANO
OS_AXIS: 120
OD_ADD: +3.00
OD_AXIS: 005
OS_ADD: +3.00
OD_CYLINDER: +0.25
OS_CYLINDER: +0.25
OS_VA1: 20/20
OS_VA1: 20/20
OU_VA: 20/20
OS_CYLINDER: +0.25
OS_VA2: 20/20(J1+)
OD_AXIS: 005
OD_SPHERE: PLANO
OD_ADD: +2.75
OS_CYLINDER: +0.25
OD_VA2: 20/20(J1+)
OS_SPHERE: PLANO
OD_VA1: 20/20
OS_SPHERE: PLANO
OS_VA1: 20/20
OD_AXIS: 005
OS_ADD: +2.75
OU_VA: 20/20
OD_VA1: 20/20
OS_AXIS: 120
OS_VA2: 20/20(J1+)
OD_CYLINDER: +0.25
OD_CYLINDER: +0.25

## 2023-02-06 ASSESSMENT — SUPERFICIAL PUNCTATE KERATITIS (SPK)
OD_SPK: 2+
OS_SPK: 2+

## 2023-02-06 ASSESSMENT — CONFRONTATIONAL VISUAL FIELD TEST (CVF)
OD_FINDINGS: FULL
OS_FINDINGS: FULL

## 2023-02-06 ASSESSMENT — LID EXAM ASSESSMENTS: OD_TRICHIASIS: RUL

## 2023-02-06 ASSESSMENT — AXIALLENGTH_DERIVED
OS_AL: 22.9662
OD_AL: 22.9227

## 2023-02-06 ASSESSMENT — REFRACTION_AUTOREFRACTION
OD_AXIS: 006
OS_AXIS: 118
OD_SPHERE: -0.25
OD_CYLINDER: +0.50
OS_CYLINDER: +0.50
OS_SPHERE: -0.25

## 2023-02-06 ASSESSMENT — SPHEQUIV_DERIVED
OS_SPHEQUIV: 0
OD_SPHEQUIV: 0

## 2023-02-06 ASSESSMENT — KERATOMETRY
OD_AXISANGLE_DEGREES: 019
OD_K1POWER_DIOPTERS: 45.25
OS_AXISANGLE_DEGREES: 083
OS_K1POWER_DIOPTERS: 45.00
OS_K2POWER_DIOPTERS: 45.50
OD_K2POWER_DIOPTERS: 45.50
METHOD_AUTO_MANUAL: AUTO

## 2023-02-06 ASSESSMENT — TEAR BREAK UP TIME (TBUT)
OD_TBUT: 2 SEC
OS_TBUT: 2 SEC

## 2023-02-06 ASSESSMENT — VISUAL ACUITY
OS_BCVA: 20/20-2
OD_BCVA: 20/25+

## 2023-02-15 ENCOUNTER — NON-APPOINTMENT (OUTPATIENT)
Age: 83
End: 2023-02-15

## 2023-05-01 ENCOUNTER — APPOINTMENT (OUTPATIENT)
Dept: CARDIOLOGY | Facility: CLINIC | Age: 83
End: 2023-05-01
Payer: MEDICARE

## 2023-05-01 ENCOUNTER — NON-APPOINTMENT (OUTPATIENT)
Age: 83
End: 2023-05-01

## 2023-05-01 VITALS
OXYGEN SATURATION: 96 % | HEART RATE: 71 BPM | DIASTOLIC BLOOD PRESSURE: 64 MMHG | WEIGHT: 150 LBS | HEIGHT: 65 IN | BODY MASS INDEX: 24.99 KG/M2 | SYSTOLIC BLOOD PRESSURE: 118 MMHG

## 2023-05-01 DIAGNOSIS — I47.29 OTHER VENTRICULAR TACHYCARDIA: ICD-10-CM

## 2023-05-01 DIAGNOSIS — C34.90 MALIGNANT NEOPLASM OF UNSPECIFIED PART OF UNSPECIFIED BRONCHUS OR LUNG: ICD-10-CM

## 2023-05-01 PROCEDURE — 99214 OFFICE O/P EST MOD 30 MIN: CPT

## 2023-05-01 PROCEDURE — 93000 ELECTROCARDIOGRAM COMPLETE: CPT

## 2023-05-01 RX ORDER — OMEPRAZOLE MAGNESIUM 20 MG/1
20 TABLET, DELAYED RELEASE ORAL DAILY
Refills: 0 | Status: DISCONTINUED | COMMUNITY
Start: 2022-04-15 | End: 2023-05-01

## 2023-05-01 RX ORDER — OMEGA-3/DHA/EPA/FISH OIL 300-1000MG
CAPSULE ORAL
Refills: 0 | Status: ACTIVE | COMMUNITY

## 2023-05-01 RX ORDER — OMEPRAZOLE 40 MG/1
40 CAPSULE, DELAYED RELEASE ORAL DAILY
Refills: 0 | Status: ACTIVE | COMMUNITY

## 2023-05-01 RX ORDER — ATORVASTATIN CALCIUM 10 MG/1
10 TABLET, FILM COATED ORAL DAILY
Qty: 90 | Refills: 1 | Status: DISCONTINUED | COMMUNITY
Start: 2020-12-29 | End: 2023-05-01

## 2023-05-01 RX ORDER — CYANOCOBALAMIN (VITAMIN B-12) 1000 MCG
1000 TABLET ORAL DAILY
Refills: 0 | Status: DISCONTINUED | COMMUNITY
End: 2023-05-01

## 2023-05-01 RX ORDER — GABAPENTIN 100 MG/1
100 CAPSULE ORAL
Refills: 0 | Status: ACTIVE | COMMUNITY

## 2023-05-01 RX ORDER — B-COMPLEX WITH VITAMIN C
TABLET ORAL
Refills: 0 | Status: ACTIVE | COMMUNITY

## 2023-05-01 RX ORDER — GABAPENTIN 300 MG/1
300 CAPSULE ORAL
Refills: 0 | Status: ACTIVE | COMMUNITY

## 2023-05-01 RX ORDER — LEVETIRACETAM 500 MG/1
500 TABLET, FILM COATED ORAL DAILY
Refills: 0 | Status: ACTIVE | COMMUNITY

## 2023-05-01 NOTE — DISCUSSION/SUMMARY
[FreeTextEntry1] : 83-year-old female is seen in the office with above medical history and active medical problems as noted below\par #1 hyperlipidemia.  Stable LDL cholesterol.  At goal of less than 70.  Tolerating low-dose of rosuvastatin well.  Lifestyle and risk factor modifications reviewed.  Follow liver function tests as long as stable on a yearly basis on statin therapy.\par #2 peripheral arterial disease with borderline GURU/PVR study and carotid atherosclerosis. \par Continue lifestyle and risk factor modifications. . No neurological event. No significant signs of ischemia on lower extremity examination  or significant claudication pain. Continue activity in the form of walking\par Continue with aspirin.  Low risk of bleeding.  Continue statin therapy.\par #3 history of nonsustained ventricular tachycardia. No correlation with her dizziness. Preserved LV systolic function. Normal coronary arteries in the past.  No signs of unstable CAD.. A stable blood pressure, heart rate. Not on beta blockers. No history of syncopal episode. Continue present medications at present. \par #4  Nonrheumatic mitral and aortic insufficiency.  Follow-up echocardiogram to evaluate for severity of mitral and aortic insufficiency LV RV function pulmonary artery systolic pressure\par #5. Chest pain.  Resolved.  No recurrence.  In the past responded to PPI.\par \par Counseling regarding low saturated fat, salt and carbohydrate intake was reviewed. Active lifestyle and regular. Exercise along with weight management is advised.\par \par All the above were at length reviewed. Answered all the questions. Thank you very much for this kind referral. Please do not hesitate to give me a call for any question.\par Part of this transcription was done with voice recognition software and phonetically similar errors are common. I apologize for that. Please donot hesitate to call for any questions due to above.\par \par Sincerely,\par Ekaterina Mccartney MD,FACC,RUI\par

## 2023-05-01 NOTE — REVIEW OF SYSTEMS
[Dyspnea on exertion] : dyspnea during exertion [Joint Pain] : joint pain [Joint Stiffness] : joint stiffness [Negative] : Psychiatric [Chest Discomfort] : no chest discomfort [Lower Ext Edema] : no extremity edema [Leg Claudication] : no intermittent leg claudication [Palpitations] : no palpitations [Orthopnea] : no orthopnea [PND] : no PND [Syncope] : no syncope [Dizziness] : no dizziness [FreeTextEntry5] : As noted above [de-identified] : As per HPI

## 2023-05-01 NOTE — PHYSICAL EXAM
[Well Developed] : well developed [Well Nourished] : well nourished [No Acute Distress] : no acute distress [Normal Venous Pressure] : normal venous pressure [No Carotid Bruit] : no carotid bruit [Normal S1, S2] : normal S1, S2 [No Rub] : no rub [No Gallop] : no gallop [Clear Lung Fields] : clear lung fields [Good Air Entry] : good air entry [No Respiratory Distress] : no respiratory distress  [No Edema] : no edema [No Cyanosis] : no cyanosis [Moves all extremities] : moves all extremities [No Clubbing] : no clubbing [Normal Speech] : normal speech [Alert and Oriented] : alert and oriented [de-identified] : Midsystolic murmur no gallop or rub. [de-identified] : Walks with a walker

## 2023-05-01 NOTE — REASON FOR VISIT
[Symptom and Test Evaluation] : symptom and test evaluation [Hyperlipidemia] : hyperlipidemia [Carotid, Aortic and Peripheral Vascular Disease] : carotid, aortic and peripheral vascular disease [FreeTextEntry3] : Dr. Carmona [FreeTextEntry1] : 83-year-old female is seen in the office for follow-up consultation.  He is not being treated for lung cancer.  S/p biopsy.  S/p radiation.  Prior to that she got chemo and radiation for ovarian cancer for which she had D&C and hysterectomy.\par She denies any significant chest pain.  She has stable exertional dyspnea without any PND orthopnea palpitation.\par She has intermittent postural dizziness.  No associated palpitations.  Mild.  No associated near syncopal or syncopal event.  Improved with hydration\par She walks with a walker.\par She denies any claudication pain\par She has stable weight\par She has no significant side effects from taking aspirin/atorvastatin.\par I have reviewed her labs available to me.

## 2023-05-01 NOTE — CARDIOLOGY SUMMARY
[___] : [unfilled] [Normal] : normal [de-identified] : 09/1/2021, Sinus tachycardia and short AR interval, non-specific ST changes.\par May 1, 2023.  Normal sinus rhythm.  LVH by voltage [de-identified] : 11/14/2019, Mild MR, mild AI, mild-moderate TR with estimated PASP of 31mmHg. LVEF 60%.  [de-identified] : 8/3/2016, normal cor\par

## 2023-05-01 NOTE — ASSESSMENT
[FreeTextEntry1] : past tests for reference:\par \par \par Reviewed on March 4, 2019\par EKG. March 4, 2019. Indication be a cardiac arrhythmia. Dizziness, but interpretation. Normal sinus rhythm. Nonspecific ST-T changes. \par \par Reviewed on November 11, 2019\par Labs September third 2019 were reviewed.\par EKG. November 11, 2019 indications chest pain. Shortness of breath. Interpretation. Normal sinus rhythm.\par \par reviewed 3/9/19 \par labs 2/26/2020\par stable cbc, cmp ldl 96,  tc 182, hdl 71, tg 77\par echo 11/14/19 60% mild mr, normal la, pasp\par \par Reviewed on April 15, 2022.\par Labs from April 8, 2022.  Normal CBC sodium 138 potassium 4.4 creatinine 0.61 hemoglobin A1c 5.8 total cholesterol 158 triglycerides 67 HDL 71 LDL 66 TSH 2.57

## 2023-05-08 ENCOUNTER — RX ONLY (RX ONLY)
Age: 83
End: 2023-05-08

## 2023-05-08 ENCOUNTER — OFFICE (OUTPATIENT)
Dept: URBAN - METROPOLITAN AREA CLINIC 97 | Facility: CLINIC | Age: 83
Setting detail: OPHTHALMOLOGY
End: 2023-05-08
Payer: MEDICARE

## 2023-05-08 DIAGNOSIS — H26.493: ICD-10-CM

## 2023-05-08 DIAGNOSIS — H16.223: ICD-10-CM

## 2023-05-08 DIAGNOSIS — Z96.1: ICD-10-CM

## 2023-05-08 PROBLEM — H52.4 PRESBYOPIA: Status: ACTIVE | Noted: 2023-05-08

## 2023-05-08 PROCEDURE — 99213 OFFICE O/P EST LOW 20 MIN: CPT | Performed by: OPHTHALMOLOGY

## 2023-05-08 ASSESSMENT — KERATOMETRY
OD_K2POWER_DIOPTERS: 45.50
OD_K1POWER_DIOPTERS: 45.25
METHOD_AUTO_MANUAL: AUTO
OS_K2POWER_DIOPTERS: 45.50
OD_AXISANGLE_DEGREES: 019
OS_K1POWER_DIOPTERS: 45.00
OS_AXISANGLE_DEGREES: 083

## 2023-05-08 ASSESSMENT — REFRACTION_AUTOREFRACTION
OD_AXIS: 006
OD_CYLINDER: +0.50
OS_SPHERE: -0.25
OS_CYLINDER: +0.50
OS_AXIS: 118
OD_SPHERE: -0.25

## 2023-05-08 ASSESSMENT — REFRACTION_MANIFEST
OS_CYLINDER: +0.25
OD_VA1: 20/20
OS_VA1: 20/20
OS_ADD: +2.75
OD_AXIS: 005
OD_SPHERE: PLANO
OD_SPHERE: PLANO
OD_ADD: +3.00
OD_VA1: 20/20
OS_ADD: +2.75
OS_VA2: 20/20(J1+)
OD_ADD: +2.75
OU_VA: 20/20
OD_VA2: 20/20(J1+)
OS_AXIS: 120
OD_CYLINDER: +0.25
OS_SPHERE: PLANO
OU_VA: 20/20
OS_SPHERE: PLANO
OS_CYLINDER: +0.25
OS_CYLINDER: +0.25
OD_VA2: 20/20(J1+)
OD_CYLINDER: +0.25
OS_SPHERE: PLANO
OD_AXIS: 005
OS_ADD: +3.00
OS_AXIS: 120
OD_CYLINDER: +0.25
OD_ADD: +2.75
OS_AXIS: 120
OS_VA1: 20/20
OD_AXIS: 005
OS_VA1: 20/20
OS_VA2: 20/20(J1+)
OD_SPHERE: +0.25
OD_VA1: 20/20

## 2023-05-08 ASSESSMENT — REFRACTION_CURRENTRX
OD_CYLINDER: SPH
OS_CYLINDER: SPH
OD_VPRISM_DIRECTION: PROGS
OD_ADD: +2.75
OS_OVR_VA: 20/
OD_SPHERE: +0.50
OS_ADD: +2.75
OD_OVR_VA: 20/
OS_VPRISM_DIRECTION: PROGS
OS_SPHERE: +0.75

## 2023-05-08 ASSESSMENT — VISUAL ACUITY
OD_BCVA: 20/20
OS_BCVA: 20/20-2

## 2023-05-08 ASSESSMENT — AXIALLENGTH_DERIVED
OD_AL: 22.9227
OS_AL: 22.9662
OD_AL: 22.7856

## 2023-05-08 ASSESSMENT — SUPERFICIAL PUNCTATE KERATITIS (SPK)
OS_SPK: 1+
OD_SPK: 1+

## 2023-05-08 ASSESSMENT — LID EXAM ASSESSMENTS: OD_TRICHIASIS: RUL

## 2023-05-08 ASSESSMENT — TEAR BREAK UP TIME (TBUT)
OD_TBUT: 2 SEC
OS_TBUT: 2 SEC

## 2023-05-08 ASSESSMENT — CONFRONTATIONAL VISUAL FIELD TEST (CVF)
OD_FINDINGS: FULL
OS_FINDINGS: FULL

## 2023-05-08 ASSESSMENT — SPHEQUIV_DERIVED
OD_SPHEQUIV: 0
OS_SPHEQUIV: 0
OD_SPHEQUIV: 0.375

## 2023-07-06 ENCOUNTER — NON-APPOINTMENT (OUTPATIENT)
Age: 83
End: 2023-07-06

## 2023-07-07 ENCOUNTER — NON-APPOINTMENT (OUTPATIENT)
Age: 83
End: 2023-07-07

## 2023-10-01 PROBLEM — Z92.3 HISTORY OF RADIATION THERAPY: Status: RESOLVED | Noted: 2022-04-15 | Resolved: 2023-10-01

## 2023-11-06 ENCOUNTER — APPOINTMENT (OUTPATIENT)
Dept: CARDIOLOGY | Facility: CLINIC | Age: 83
End: 2023-11-06
Payer: MEDICARE

## 2023-11-06 ENCOUNTER — NON-APPOINTMENT (OUTPATIENT)
Age: 83
End: 2023-11-06

## 2023-11-06 VITALS — SYSTOLIC BLOOD PRESSURE: 130 MMHG | DIASTOLIC BLOOD PRESSURE: 60 MMHG

## 2023-11-06 VITALS
HEIGHT: 65 IN | HEART RATE: 66 BPM | OXYGEN SATURATION: 96 % | WEIGHT: 150 LBS | SYSTOLIC BLOOD PRESSURE: 130 MMHG | BODY MASS INDEX: 24.99 KG/M2 | DIASTOLIC BLOOD PRESSURE: 62 MMHG

## 2023-11-06 DIAGNOSIS — I34.0 NONRHEUMATIC MITRAL (VALVE) INSUFFICIENCY: ICD-10-CM

## 2023-11-06 DIAGNOSIS — R94.31 ABNORMAL ELECTROCARDIOGRAM [ECG] [EKG]: ICD-10-CM

## 2023-11-06 DIAGNOSIS — R42 DIZZINESS AND GIDDINESS: ICD-10-CM

## 2023-11-06 DIAGNOSIS — E78.5 HYPERLIPIDEMIA, UNSPECIFIED: ICD-10-CM

## 2023-11-06 DIAGNOSIS — I35.1 NONRHEUMATIC AORTIC (VALVE) INSUFFICIENCY: ICD-10-CM

## 2023-11-06 PROCEDURE — 99214 OFFICE O/P EST MOD 30 MIN: CPT

## 2023-11-06 PROCEDURE — 93306 TTE W/DOPPLER COMPLETE: CPT

## 2023-11-06 PROCEDURE — 93000 ELECTROCARDIOGRAM COMPLETE: CPT

## 2023-11-13 ENCOUNTER — OFFICE (OUTPATIENT)
Dept: URBAN - METROPOLITAN AREA CLINIC 97 | Facility: CLINIC | Age: 83
Setting detail: OPHTHALMOLOGY
End: 2023-11-13
Payer: MEDICARE

## 2023-11-13 DIAGNOSIS — H02.011: ICD-10-CM

## 2023-11-13 DIAGNOSIS — H16.223: ICD-10-CM

## 2023-11-13 DIAGNOSIS — H35.373: ICD-10-CM

## 2023-11-13 DIAGNOSIS — H26.493: ICD-10-CM

## 2023-11-13 DIAGNOSIS — Z96.1: ICD-10-CM

## 2023-11-13 PROCEDURE — 99213 OFFICE O/P EST LOW 20 MIN: CPT | Performed by: OPHTHALMOLOGY

## 2023-11-13 PROCEDURE — 92134 CPTRZ OPH DX IMG PST SGM RTA: CPT | Performed by: OPHTHALMOLOGY

## 2023-11-13 ASSESSMENT — REFRACTION_MANIFEST
OD_VA1: 20/20
OD_SPHERE: PLANO
OD_ADD: +2.75
OU_VA: 20/20
OD_CYLINDER: +0.25
OD_AXIS: 005
OD_CYLINDER: +0.25
OD_ADD: +2.75
OS_AXIS: 120
OD_VA1: 20/20
OD_ADD: +3.00
OD_VA2: 20/20(J1+)
OD_SPHERE: +0.25
OS_VA2: 20/20(J1+)
OS_AXIS: 120
OS_SPHERE: PLANO
OS_AXIS: 120
OS_VA2: 20/20(J1+)
OD_VA2: 20/20(J1+)
OS_ADD: +2.75
OD_SPHERE: PLANO
OS_CYLINDER: +0.25
OD_AXIS: 005
OS_CYLINDER: +0.25
OD_AXIS: 005
OS_VA1: 20/20
OS_SPHERE: PLANO
OS_ADD: +3.00
OD_CYLINDER: +0.25
OS_ADD: +2.75
OS_VA1: 20/20
OU_VA: 20/20
OS_CYLINDER: +0.25
OD_VA1: 20/20
OS_VA1: 20/20
OS_SPHERE: PLANO

## 2023-11-13 ASSESSMENT — REFRACTION_CURRENTRX
OS_OVR_VA: 20/
OS_SPHERE: +0.75
OD_VPRISM_DIRECTION: PROGS
OS_CYLINDER: SPH
OD_ADD: +2.75
OD_SPHERE: +0.50
OD_OVR_VA: 20/
OS_ADD: +2.75
OD_CYLINDER: SPH
OS_VPRISM_DIRECTION: PROGS

## 2023-11-13 ASSESSMENT — REFRACTION_AUTOREFRACTION
OS_CYLINDER: +0.50
OS_AXIS: 127
OD_CYLINDER: +0.50
OD_AXIS: 016
OS_SPHERE: -0.25
OD_SPHERE: PLANO

## 2023-11-13 ASSESSMENT — SPHEQUIV_DERIVED
OD_SPHEQUIV: 0.375
OS_SPHEQUIV: 0

## 2023-11-13 ASSESSMENT — LID EXAM ASSESSMENTS: OD_TRICHIASIS: RUL

## 2023-11-13 ASSESSMENT — CONFRONTATIONAL VISUAL FIELD TEST (CVF)
OD_FINDINGS: FULL
OS_FINDINGS: FULL

## 2023-11-13 ASSESSMENT — TEAR BREAK UP TIME (TBUT)
OD_TBUT: 2 SEC
OS_TBUT: 2 SEC

## 2023-11-13 ASSESSMENT — SUPERFICIAL PUNCTATE KERATITIS (SPK)
OS_SPK: 1+
OD_SPK: 1+

## 2024-04-10 RX ORDER — ROSUVASTATIN CALCIUM 5 MG/1
5 TABLET, FILM COATED ORAL DAILY
Qty: 90 | Refills: 1 | Status: ACTIVE | COMMUNITY
Start: 2022-10-14 | End: 1900-01-01

## 2024-05-02 ENCOUNTER — NON-APPOINTMENT (OUTPATIENT)
Age: 84
End: 2024-05-02

## 2024-05-03 ENCOUNTER — APPOINTMENT (OUTPATIENT)
Dept: CARDIOLOGY | Facility: CLINIC | Age: 84
End: 2024-05-03
Payer: MEDICARE

## 2024-05-03 PROCEDURE — 93880 EXTRACRANIAL BILAT STUDY: CPT

## 2024-05-04 ENCOUNTER — NON-APPOINTMENT (OUTPATIENT)
Age: 84
End: 2024-05-04

## 2024-06-26 ENCOUNTER — OFFICE (OUTPATIENT)
Dept: URBAN - METROPOLITAN AREA CLINIC 97 | Facility: CLINIC | Age: 84
Setting detail: OPHTHALMOLOGY
End: 2024-06-26
Payer: MEDICARE

## 2024-06-26 DIAGNOSIS — H02.031: ICD-10-CM

## 2024-06-26 DIAGNOSIS — H43.813: ICD-10-CM

## 2024-06-26 DIAGNOSIS — H26.493: ICD-10-CM

## 2024-06-26 DIAGNOSIS — Z96.1: ICD-10-CM

## 2024-06-26 DIAGNOSIS — H35.373: ICD-10-CM

## 2024-06-26 DIAGNOSIS — H02.011: ICD-10-CM

## 2024-06-26 DIAGNOSIS — H16.223: ICD-10-CM

## 2024-06-26 PROCEDURE — 92014 COMPRE OPH EXAM EST PT 1/>: CPT | Performed by: OPTOMETRIST

## 2024-06-26 ASSESSMENT — LID POSITION - ENTROPION: OD_ENTROPION: RUL 2+

## 2024-06-26 ASSESSMENT — CONFRONTATIONAL VISUAL FIELD TEST (CVF)
OD_FINDINGS: FULL
OS_FINDINGS: FULL

## 2024-06-26 ASSESSMENT — LID EXAM ASSESSMENTS: OD_TRICHIASIS: RUL

## 2024-08-08 ENCOUNTER — APPOINTMENT (OUTPATIENT)
Dept: ORTHOPEDIC SURGERY | Facility: CLINIC | Age: 84
End: 2024-08-08

## 2024-08-08 PROCEDURE — 99223 1ST HOSP IP/OBS HIGH 75: CPT | Mod: 57

## 2024-08-08 PROCEDURE — 27520 TREAT KNEECAP FRACTURE: CPT | Mod: RT

## 2024-09-06 ENCOUNTER — APPOINTMENT (OUTPATIENT)
Dept: ORTHOPEDIC SURGERY | Facility: CLINIC | Age: 84
End: 2024-09-06
Payer: MEDICARE

## 2024-09-06 DIAGNOSIS — S82.034D NONDISPLACED TRANSVERSE FRACTURE OF RIGHT PATELLA, SUBSEQUENT ENCOUNTER FOR CLOSED FRACTURE WITH ROUTINE HEALING: ICD-10-CM

## 2024-09-06 DIAGNOSIS — Z86.39 PERSONAL HISTORY OF OTHER ENDOCRINE, NUTRITIONAL AND METABOLIC DISEASE: ICD-10-CM

## 2024-09-06 DIAGNOSIS — M62.50 MUSCLE WASTING AND ATROPHY, NOT ELSEWHERE CLASSIFIED, UNSPECIFIED SITE: ICD-10-CM

## 2024-09-06 DIAGNOSIS — Z86.2 PERSONAL HISTORY OF DISEASES OF THE BLOOD AND BLOOD-FORMING ORGANS AND CERTAIN DISORDERS INVOLVING THE IMMUNE MECHANISM: ICD-10-CM

## 2024-09-06 PROCEDURE — 99024 POSTOP FOLLOW-UP VISIT: CPT

## 2024-09-08 PROBLEM — S82.034D CLOSED NONDISPLACED TRANSVERSE FRACTURE OF RIGHT PATELLA WITH ROUTINE HEALING, SUBSEQUENT ENCOUNTER: Status: ACTIVE | Noted: 2024-09-08

## 2024-09-08 NOTE — DISCUSSION/SUMMARY
[de-identified] : I reviewed patient's radiographs and discussed her condition and treatment course.  Discontinue KI.  May be WBAT, use walker for assistance.  Follow up in 3 weeks XRs.  Patient voiced understanding and agreement with the plan.

## 2024-09-08 NOTE — HISTORY OF PRESENT ILLNESS
[de-identified] : Patient presents for RT knee pain follow up. Patient had a fall last month, diagnosed with patella fracture.  She has kept KI on and was discharged from hospital to rehab.  She denies any pain and is eager to return to walking without brace.

## 2024-09-08 NOTE — PHYSICAL EXAM
[NL (0)] : extension 0 degrees [5___] : hamstring 5[unfilled]/5 [] : uses wheelchair [Right] : right knee [AP] : anteroposterior [Lateral] : lateral [Outside films reviewed] : Outside films reviewed [FreeTextEntry3] : skin intact out of KI [FreeTextEntry9] : healing patella fracture [TWNoteComboBox7] : flexion 80 degrees

## 2024-09-08 NOTE — HISTORY OF PRESENT ILLNESS
[de-identified] : Patient presents for RT knee pain follow up. Patient had a fall last month, diagnosed with patella fracture.  She has kept KI on and was discharged from hospital to rehab.  She denies any pain and is eager to return to walking without brace.

## 2024-09-08 NOTE — DISCUSSION/SUMMARY
[de-identified] : I reviewed patient's radiographs and discussed her condition and treatment course.  Discontinue KI.  May be WBAT, use walker for assistance.  Follow up in 3 weeks XRs.  Patient voiced understanding and agreement with the plan.

## 2024-09-27 ENCOUNTER — APPOINTMENT (OUTPATIENT)
Dept: ORTHOPEDIC SURGERY | Facility: CLINIC | Age: 84
End: 2024-09-27
Payer: MEDICARE

## 2024-09-27 DIAGNOSIS — S82.034D NONDISPLACED TRANSVERSE FRACTURE OF RIGHT PATELLA, SUBSEQUENT ENCOUNTER FOR CLOSED FRACTURE WITH ROUTINE HEALING: ICD-10-CM

## 2024-09-27 PROCEDURE — 73560 X-RAY EXAM OF KNEE 1 OR 2: CPT | Mod: RT

## 2024-09-27 PROCEDURE — 99024 POSTOP FOLLOW-UP VISIT: CPT

## 2024-09-27 NOTE — PHYSICAL EXAM
[NL (0)] : extension 0 degrees [5___] : hamstring 5[unfilled]/5 [Right] : right knee [AP] : anteroposterior [Lateral] : lateral [] : uses walker [FreeTextEntry9] : healed patella fracture [TWNoteComboBox7] : flexion 110 degrees

## 2024-09-27 NOTE — DISCUSSION/SUMMARY
[de-identified] : I reviewed patient's radiographs and discussed her condition and treatment course.  Continue PT and HEP.  Follow up in 4 weeks ROM check.  Patient voiced understanding and agreement with the plan.

## 2024-09-27 NOTE — HISTORY OF PRESENT ILLNESS
[de-identified] :  Since last visit, patient presents using a walker to help ambulate. States she is doing ok still having some trouble doing certain things.  Admits to only being released from nursing home on Wednesday and will start home PT next week.

## 2024-10-23 ENCOUNTER — OFFICE (OUTPATIENT)
Dept: URBAN - METROPOLITAN AREA CLINIC 97 | Facility: CLINIC | Age: 84
Setting detail: OPHTHALMOLOGY
End: 2024-10-23
Payer: MEDICARE

## 2024-10-23 DIAGNOSIS — H01.001: ICD-10-CM

## 2024-10-23 DIAGNOSIS — H01.004: ICD-10-CM

## 2024-10-23 DIAGNOSIS — H35.373: ICD-10-CM

## 2024-10-23 DIAGNOSIS — H43.813: ICD-10-CM

## 2024-10-23 PROBLEM — B88.0 ACARIASIS, INFESTATION OF MITES AND OR TICS ; BOTH EYES: Status: ACTIVE | Noted: 2024-10-23

## 2024-10-23 PROCEDURE — 92134 CPTRZ OPH DX IMG PST SGM RTA: CPT | Performed by: OPTOMETRIST

## 2024-10-23 PROCEDURE — 99213 OFFICE O/P EST LOW 20 MIN: CPT | Performed by: OPTOMETRIST

## 2024-10-23 ASSESSMENT — KERATOMETRY
OS_K1POWER_DIOPTERS: 45.25
OS_K2POWER_DIOPTERS: 45.50
OD_K2POWER_DIOPTERS: 45.50
METHOD_AUTO_MANUAL: AUTO
OD_AXISANGLE_DEGREES: 478
OD_K1POWER_DIOPTERS: 45.25
OS_AXISANGLE_DEGREES: 109

## 2024-10-23 ASSESSMENT — REFRACTION_MANIFEST
OS_SPHERE: PLANO
OS_SPHERE: PLANO
OS_CYLINDER: +0.25
OD_SPHERE: PLANO
OU_VA: 20/20
OD_ADD: +2.75
OD_ADD: +2.75
OS_ADD: +2.75
OS_CYLINDER: +0.25
OD_VA2: 20/20(J1+)
OS_AXIS: 120
OS_VA2: 20/20(J1+)
OD_CYLINDER: +0.25
OD_VA1: 20/20
OD_SPHERE: +0.25
OS_SPHERE: PLANO
OD_CYLINDER: +0.25
OS_CYLINDER: +0.25
OS_VA1: 20/20
OS_ADD: +3.00
OD_AXIS: 005
OS_AXIS: 120
OS_VA1: 20/20
OD_CYLINDER: +0.25
OD_VA1: 20/20
OD_VA2: 20/20(J1+)
OU_VA: 20/20
OS_VA1: 20/20
OD_AXIS: 005
OS_ADD: +2.75
OD_SPHERE: PLANO
OD_ADD: +3.00
OS_AXIS: 120
OD_AXIS: 005
OD_VA1: 20/20
OS_VA2: 20/20(J1+)

## 2024-10-23 ASSESSMENT — REFRACTION_CURRENTRX
OD_CYLINDER: SPH
OS_VPRISM_DIRECTION: PROGS
OD_OVR_VA: 20/
OS_CYLINDER: SPH
OS_OVR_VA: 20/
OD_VPRISM_DIRECTION: PROGS
OS_VPRISM_DIRECTION: PROGS
OD_OVR_VA: 20/
OS_ADD: +2.75
OD_ADD: +2.75
OD_SPHERE: +0.25
OS_SPHERE: +0.75
OS_ADD: +2.75
OD_CYLINDER: SPH
OS_CYLINDER: SPH
OD_VPRISM_DIRECTION: PROGS
OD_SPHERE: +0.50
OD_ADD: +2.75
OS_OVR_VA: 20/
OS_SPHERE: +0.25

## 2024-10-23 ASSESSMENT — CONFRONTATIONAL VISUAL FIELD TEST (CVF)
OD_FINDINGS: FULL
OS_FINDINGS: FULL

## 2024-10-23 ASSESSMENT — SUPERFICIAL PUNCTATE KERATITIS (SPK)
OS_SPK: 1+
OD_SPK: 1+

## 2024-10-23 ASSESSMENT — REFRACTION_AUTOREFRACTION
OD_SPHERE: PLANO
OS_SPHERE: -0.25
OS_AXIS: 121
OD_CYLINDER: +0.50
OS_CYLINDER: +0.75
OD_AXIS: 012

## 2024-10-23 ASSESSMENT — LID EXAM ASSESSMENTS
OD_COMMENTS: 2+3+ COLLARETTES
OD_BLEPHARITIS: RUL 1+
OS_COMMENTS: 2+3+ COLLARETTES
OD_TRICHIASIS: RUL
OS_BLEPHARITIS: LUL 1+

## 2024-10-23 ASSESSMENT — LID POSITION - ENTROPION: OD_ENTROPION: RUL 2+

## 2024-10-23 ASSESSMENT — TEAR BREAK UP TIME (TBUT)
OD_TBUT: 2 SEC
OS_TBUT: 2 SEC

## 2024-10-23 ASSESSMENT — VISUAL ACUITY
OD_BCVA: 20/20
OS_BCVA: 20/20-2

## 2024-10-25 ENCOUNTER — APPOINTMENT (OUTPATIENT)
Dept: ORTHOPEDIC SURGERY | Facility: CLINIC | Age: 84
End: 2024-10-25

## 2024-11-27 ENCOUNTER — APPOINTMENT (OUTPATIENT)
Dept: CARDIOLOGY | Facility: CLINIC | Age: 84
End: 2024-11-27

## 2025-03-21 ENCOUNTER — OFFICE (OUTPATIENT)
Dept: URBAN - METROPOLITAN AREA CLINIC 97 | Facility: CLINIC | Age: 85
Setting detail: OPHTHALMOLOGY
End: 2025-03-21
Payer: MEDICARE

## 2025-03-21 DIAGNOSIS — H01.004: ICD-10-CM

## 2025-03-21 DIAGNOSIS — H26.493: ICD-10-CM

## 2025-03-21 DIAGNOSIS — H16.223: ICD-10-CM

## 2025-03-21 DIAGNOSIS — Z96.1: ICD-10-CM

## 2025-03-21 DIAGNOSIS — H52.4: ICD-10-CM

## 2025-03-21 DIAGNOSIS — H01.001: ICD-10-CM

## 2025-03-21 PROCEDURE — 92015 DETERMINE REFRACTIVE STATE: CPT | Performed by: OPHTHALMOLOGY

## 2025-03-21 PROCEDURE — 99213 OFFICE O/P EST LOW 20 MIN: CPT | Performed by: OPHTHALMOLOGY

## 2025-03-21 ASSESSMENT — REFRACTION_MANIFEST
OD_ADD: +2.75
OD_ADD: +2.75
OS_AXIS: 120
OD_VA1: 20/20
OS_VA2: 20/20(J1+)
OS_CYLINDER: +0.25
OU_VA: 20/20
OS_VA2: 20/25(J1)
OU_VA: 20/20
OS_ADD: +2.75
OD_CYLINDER: +0.50
OD_AXIS: 005
OD_CYLINDER: +0.25
OD_SPHERE: PLANO
OD_AXIS: 005
OD_CYLINDER: +0.25
OS_SPHERE: PLANO
OS_SPHERE: PLANO
OD_SPHERE: +0.25
OS_ADD: +2.75
OD_SPHERE: PLANO
OS_CYLINDER: +0.25
OS_AXIS: 120
OS_VA1: 20/20
OD_VA1: 20/20
OS_VA1: 20/20
OS_SPHERE: PLANO
OD_VA2: 20/25(J1)
OS_VA1: 20/20
OD_VA2: 20/20(J1+)
OD_VA1: 20/20-
OD_ADD: +2.75
OS_AXIS: 120
OS_CYLINDER: +0.25
OD_AXIS: 005
OS_ADD: +2.75

## 2025-03-21 ASSESSMENT — KERATOMETRY
OS_AXISANGLE_DEGREES: 098
OD_AXISANGLE_DEGREES: 021
OS_K1POWER_DIOPTERS: 45.25
OD_K2POWER_DIOPTERS: 45.50
OS_K2POWER_DIOPTERS: 45.75
OD_K1POWER_DIOPTERS: 45.25
METHOD_AUTO_MANUAL: AUTO

## 2025-03-21 ASSESSMENT — REFRACTION_CURRENTRX
OS_ADD: +2.75
OD_CYLINDER: SPH
OD_ADD: +2.75
OS_CYLINDER: +0.25
OD_SPHERE: +0.50
OD_OVR_VA: 20/
OD_OVR_VA: 20/
OS_OVR_VA: 20/
OD_SPHERE: -0.25
OD_ADD: +2.75
OS_VPRISM_DIRECTION: PROGS
OD_VPRISM_DIRECTION: PROGS
OS_SPHERE: +0.75
OD_VPRISM_DIRECTION: PROGS
OS_CYLINDER: SPH
OS_VPRISM_DIRECTION: PROGS
OS_OVR_VA: 20/
OD_CYLINDER: +0.25
OS_SPHERE: -0.25
OS_ADD: +2.75

## 2025-03-21 ASSESSMENT — REFRACTION_AUTOREFRACTION
OD_CYLINDER: +0.75
OS_CYLINDER: +0.25
OD_SPHERE: PLANO
OS_SPHERE: +0.25
OS_AXIS: 118
OD_AXIS: 002

## 2025-03-21 ASSESSMENT — TEAR BREAK UP TIME (TBUT)
OS_TBUT: 2 SEC
OD_TBUT: 2 SEC

## 2025-03-21 ASSESSMENT — TONOMETRY
OD_IOP_MMHG: 10
OS_IOP_MMHG: 10

## 2025-03-21 ASSESSMENT — CONFRONTATIONAL VISUAL FIELD TEST (CVF)
OS_FINDINGS: FULL
OD_FINDINGS: FULL

## 2025-03-21 ASSESSMENT — LID EXAM ASSESSMENTS
OD_BLEPHARITIS: RUL 1+
OS_BLEPHARITIS: LUL 1+

## 2025-03-21 ASSESSMENT — VISUAL ACUITY
OS_BCVA: 20/25
OD_BCVA: 20/25+

## 2025-03-21 ASSESSMENT — SUPERFICIAL PUNCTATE KERATITIS (SPK)
OS_SPK: 1+
OD_SPK: 1+

## 2025-04-10 ENCOUNTER — APPOINTMENT (OUTPATIENT)
Dept: CARDIOLOGY | Facility: CLINIC | Age: 85
End: 2025-04-10
Payer: MEDICARE

## 2025-04-10 ENCOUNTER — NON-APPOINTMENT (OUTPATIENT)
Age: 85
End: 2025-04-10

## 2025-04-10 VITALS
HEIGHT: 65 IN | BODY MASS INDEX: 23.32 KG/M2 | OXYGEN SATURATION: 96 % | HEART RATE: 72 BPM | WEIGHT: 140 LBS | DIASTOLIC BLOOD PRESSURE: 60 MMHG | SYSTOLIC BLOOD PRESSURE: 126 MMHG

## 2025-04-10 DIAGNOSIS — K21.9 GASTRO-ESOPHAGEAL REFLUX DISEASE W/OUT ESOPHAGITIS: ICD-10-CM

## 2025-04-10 DIAGNOSIS — R94.31 ABNORMAL ELECTROCARDIOGRAM [ECG] [EKG]: ICD-10-CM

## 2025-04-10 DIAGNOSIS — I34.0 NONRHEUMATIC MITRAL (VALVE) INSUFFICIENCY: ICD-10-CM

## 2025-04-10 PROCEDURE — 93000 ELECTROCARDIOGRAM COMPLETE: CPT

## 2025-04-10 PROCEDURE — 99214 OFFICE O/P EST MOD 30 MIN: CPT

## 2025-04-10 RX ORDER — BACILLUS COAGULANS/INULIN 1B-250 MG
CAPSULE ORAL
Refills: 0 | Status: ACTIVE | COMMUNITY

## 2025-05-29 ENCOUNTER — APPOINTMENT (OUTPATIENT)
Dept: CARDIOLOGY | Facility: CLINIC | Age: 85
End: 2025-05-29
Payer: MEDICARE

## 2025-05-29 ENCOUNTER — NON-APPOINTMENT (OUTPATIENT)
Age: 85
End: 2025-05-29

## 2025-05-29 VITALS
WEIGHT: 140 LBS | OXYGEN SATURATION: 98 % | SYSTOLIC BLOOD PRESSURE: 116 MMHG | HEART RATE: 68 BPM | DIASTOLIC BLOOD PRESSURE: 68 MMHG | BODY MASS INDEX: 23.3 KG/M2

## 2025-05-29 DIAGNOSIS — Z86.2 PERSONAL HISTORY OF DISEASES OF THE BLOOD AND BLOOD-FORMING ORGANS AND CERTAIN DISORDERS INVOLVING THE IMMUNE MECHANISM: ICD-10-CM

## 2025-05-29 DIAGNOSIS — I34.0 NONRHEUMATIC MITRAL (VALVE) INSUFFICIENCY: ICD-10-CM

## 2025-05-29 DIAGNOSIS — I35.1 NONRHEUMATIC AORTIC (VALVE) INSUFFICIENCY: ICD-10-CM

## 2025-05-29 DIAGNOSIS — E78.5 HYPERLIPIDEMIA, UNSPECIFIED: ICD-10-CM

## 2025-05-29 DIAGNOSIS — Z86.39 PERSONAL HISTORY OF OTHER ENDOCRINE, NUTRITIONAL AND METABOLIC DISEASE: ICD-10-CM

## 2025-05-29 DIAGNOSIS — I73.9 PERIPHERAL VASCULAR DISEASE, UNSPECIFIED: ICD-10-CM

## 2025-05-29 DIAGNOSIS — M62.50 MUSCLE WASTING AND ATROPHY, NOT ELSEWHERE CLASSIFIED, UNSPECIFIED SITE: ICD-10-CM

## 2025-05-29 DIAGNOSIS — R94.31 ABNORMAL ELECTROCARDIOGRAM [ECG] [EKG]: ICD-10-CM

## 2025-05-29 PROCEDURE — 99214 OFFICE O/P EST MOD 30 MIN: CPT

## 2025-05-29 PROCEDURE — 93306 TTE W/DOPPLER COMPLETE: CPT

## 2025-05-29 PROCEDURE — 93000 ELECTROCARDIOGRAM COMPLETE: CPT

## 2025-05-29 RX ORDER — PSYLLIUM HUSK 0.4 G
CAPSULE ORAL
Refills: 0 | Status: ACTIVE | COMMUNITY

## 2025-05-30 LAB — HBA1C MFR BLD HPLC: 5.7

## 2025-06-17 ENCOUNTER — APPOINTMENT (OUTPATIENT)
Dept: CARDIOLOGY | Facility: CLINIC | Age: 85
End: 2025-06-17

## 2025-07-10 ENCOUNTER — APPOINTMENT (OUTPATIENT)
Dept: CARDIOLOGY | Facility: CLINIC | Age: 85
End: 2025-07-10
Payer: MEDICARE

## 2025-07-10 VITALS
BODY MASS INDEX: 23.3 KG/M2 | WEIGHT: 140 LBS | HEART RATE: 77 BPM | DIASTOLIC BLOOD PRESSURE: 64 MMHG | OXYGEN SATURATION: 96 % | SYSTOLIC BLOOD PRESSURE: 120 MMHG

## 2025-07-10 PROCEDURE — 99214 OFFICE O/P EST MOD 30 MIN: CPT

## 2025-08-11 DIAGNOSIS — E78.5 HYPERLIPIDEMIA, UNSPECIFIED: ICD-10-CM

## 2025-08-11 DIAGNOSIS — I73.9 PERIPHERAL VASCULAR DISEASE, UNSPECIFIED: ICD-10-CM

## 2025-08-11 DIAGNOSIS — R06.09 OTHER FORMS OF DYSPNEA: ICD-10-CM

## 2025-08-11 DIAGNOSIS — R94.31 ABNORMAL ELECTROCARDIOGRAM [ECG] [EKG]: ICD-10-CM
